# Patient Record
Sex: FEMALE | Race: WHITE | Employment: FULL TIME
[De-identification: names, ages, dates, MRNs, and addresses within clinical notes are randomized per-mention and may not be internally consistent; named-entity substitution may affect disease eponyms.]

---

## 2018-03-15 ENCOUNTER — TELEPHONE (OUTPATIENT)
Dept: CASE MANAGEMENT | Age: 48
End: 2018-03-15

## 2018-03-15 NOTE — LETTER
Program or our CT Lung Cancer Screening Program,  please don't hesitate to call. Sincerely,    Pulmonary Nodule Program  [address]:  [phone]  F:  [phone]      3/15/2018      Jonna Soler  [address] [facility] [address]              Dear Angie Branch records indicate that over the past year you had an imaging study/studies done while a patient at a [facility] facility. Because your health is our primary concern, we want to make sure we have the correct primary care physician on file. We currently have Nadia Bustamante MD noted as your primary care physician. If this information is not accurate, please call [phone] and provide us with the correct information. If this information is correct, please make sure you have discussed your visit with your physician and understand all results and any follow up recommendations that may or may not be needed now or in the future.            Sincerely,    [address]:  [phone]  F:  [phone]

## 2018-03-15 NOTE — TELEPHONE ENCOUNTER
No call was made, encounter was opened for documentation in the lung nodule navigator flow sheet. Patient has suggested follow up per [place] for management of incidental pulmonary nodules. Patients with nodules measuring under 0.8cm are not automatically enrolled into incidental pulmonary nodule program.   Notification of nodules letter faxed to Patrick Taylor MD 3/15/2018 4:10 PM along with instructions on how to enroll this patient into our incidental pulmonary nodule program if desired. Receipt verified. Letter mailed to patients home stating she should contact her primary care physician to discuss any follow up recommendations.    Sari Webb, Lung Nodule Navigator

## 2018-04-06 ENCOUNTER — HOSPITAL ENCOUNTER (INPATIENT)
Age: 48
LOS: 1 days | Discharge: HOME | DRG: 069 | End: 2018-04-07
Attending: EMERGENCY MEDICINE | Admitting: INTERNAL MEDICINE
Payer: COMMERCIAL

## 2018-04-06 ENCOUNTER — APPOINTMENT (OUTPATIENT)
Dept: CT IMAGING | Age: 48
DRG: 069 | End: 2018-04-06
Payer: COMMERCIAL

## 2018-04-06 DIAGNOSIS — G45.9 TRANSIENT CEREBRAL ISCHEMIA, UNSPECIFIED TYPE: Primary | ICD-10-CM

## 2018-04-06 PROBLEM — R47.1 DYSARTHRIA: Status: ACTIVE | Noted: 2018-04-06

## 2018-04-06 PROBLEM — E78.2 MIXED HYPERLIPIDEMIA: Status: ACTIVE | Noted: 2018-04-06

## 2018-04-06 PROBLEM — I10 HTN (HYPERTENSION): Status: ACTIVE | Noted: 2018-04-06

## 2018-04-06 PROBLEM — R29.810 FACIAL DROOP: Status: ACTIVE | Noted: 2018-04-06

## 2018-04-06 LAB
ALBUMIN SERPL-MCNC: 4 G/DL (ref 3.5–5.2)
ALP BLD-CCNC: 84 U/L (ref 35–104)
ALT SERPL-CCNC: 10 U/L (ref 0–32)
ANION GAP SERPL CALCULATED.3IONS-SCNC: 19 MMOL/L (ref 7–16)
APTT: 28.7 SEC (ref 24.5–35.1)
AST SERPL-CCNC: 15 U/L (ref 0–31)
BILIRUB SERPL-MCNC: 0.5 MG/DL (ref 0–1.2)
BUN BLDV-MCNC: 9 MG/DL (ref 6–20)
CALCIUM SERPL-MCNC: 9.2 MG/DL (ref 8.6–10.2)
CHLORIDE BLD-SCNC: 99 MMOL/L (ref 98–107)
CO2: 21 MMOL/L (ref 22–29)
CREAT SERPL-MCNC: 0.7 MG/DL (ref 0.5–1)
GFR AFRICAN AMERICAN: >60
GFR NON-AFRICAN AMERICAN: >60 ML/MIN/1.73
GLUCOSE BLD-MCNC: 101 MG/DL (ref 74–109)
HCT VFR BLD CALC: 42.8 % (ref 34–48)
HEMOGLOBIN: 14 G/DL (ref 11.5–15.5)
INR BLD: 1.2
LACTIC ACID: 1 MMOL/L (ref 0.5–2.2)
MCH RBC QN AUTO: 27.5 PG (ref 26–35)
MCHC RBC AUTO-ENTMCNC: 32.7 % (ref 32–34.5)
MCV RBC AUTO: 84.1 FL (ref 80–99.9)
PDW BLD-RTO: 13.9 FL (ref 11.5–15)
PLATELET # BLD: 288 E9/L (ref 130–450)
PMV BLD AUTO: 10.8 FL (ref 7–12)
POTASSIUM SERPL-SCNC: 4 MMOL/L (ref 3.5–5)
PROTHROMBIN TIME: 12.6 SEC (ref 9.3–12.4)
RBC # BLD: 5.09 E12/L (ref 3.5–5.5)
SODIUM BLD-SCNC: 139 MMOL/L (ref 132–146)
TOTAL PROTEIN: 7.1 G/DL (ref 6.4–8.3)
TROPONIN: <0.01 NG/ML (ref 0–0.03)
WBC # BLD: 13 E9/L (ref 4.5–11.5)

## 2018-04-06 PROCEDURE — 99291 CRITICAL CARE FIRST HOUR: CPT | Performed by: NURSE PRACTITIONER

## 2018-04-06 PROCEDURE — 6360000004 HC RX CONTRAST MEDICATION: Performed by: RADIOLOGY

## 2018-04-06 PROCEDURE — 93005 ELECTROCARDIOGRAM TRACING: CPT | Performed by: EMERGENCY MEDICINE

## 2018-04-06 PROCEDURE — 93005 ELECTROCARDIOGRAM TRACING: CPT

## 2018-04-06 PROCEDURE — 70498 CT ANGIOGRAPHY NECK: CPT

## 2018-04-06 PROCEDURE — 85027 COMPLETE CBC AUTOMATED: CPT

## 2018-04-06 PROCEDURE — 80053 COMPREHEN METABOLIC PANEL: CPT

## 2018-04-06 PROCEDURE — 83605 ASSAY OF LACTIC ACID: CPT

## 2018-04-06 PROCEDURE — 70450 CT HEAD/BRAIN W/O DYE: CPT

## 2018-04-06 PROCEDURE — 99285 EMERGENCY DEPT VISIT HI MDM: CPT

## 2018-04-06 PROCEDURE — 84484 ASSAY OF TROPONIN QUANT: CPT

## 2018-04-06 PROCEDURE — 70496 CT ANGIOGRAPHY HEAD: CPT

## 2018-04-06 PROCEDURE — 85730 THROMBOPLASTIN TIME PARTIAL: CPT

## 2018-04-06 PROCEDURE — 6370000000 HC RX 637 (ALT 250 FOR IP): Performed by: INTERNAL MEDICINE

## 2018-04-06 PROCEDURE — 0042T CT BRAIN PERFUSION: CPT

## 2018-04-06 PROCEDURE — 36415 COLL VENOUS BLD VENIPUNCTURE: CPT

## 2018-04-06 PROCEDURE — 2060000000 HC ICU INTERMEDIATE R&B

## 2018-04-06 PROCEDURE — 85610 PROTHROMBIN TIME: CPT

## 2018-04-06 PROCEDURE — 2580000003 HC RX 258: Performed by: INTERNAL MEDICINE

## 2018-04-06 RX ORDER — ACETAMINOPHEN 325 MG/1
650 TABLET ORAL EVERY 4 HOURS PRN
Status: DISCONTINUED | OUTPATIENT
Start: 2018-04-06 | End: 2018-04-07 | Stop reason: HOSPADM

## 2018-04-06 RX ORDER — VITAMIN C
1 TAB ORAL DAILY
Status: DISCONTINUED | OUTPATIENT
Start: 2018-04-07 | End: 2018-04-07 | Stop reason: HOSPADM

## 2018-04-06 RX ORDER — SODIUM CHLORIDE 0.9 % (FLUSH) 0.9 %
10 SYRINGE (ML) INJECTION PRN
Status: DISCONTINUED | OUTPATIENT
Start: 2018-04-06 | End: 2018-04-07 | Stop reason: HOSPADM

## 2018-04-06 RX ORDER — EPINEPHRINE 0.3 MG/.3ML
0.3 INJECTION SUBCUTANEOUS PRN
Status: ON HOLD | COMMUNITY
End: 2018-04-07 | Stop reason: HOSPADM

## 2018-04-06 RX ORDER — ATORVASTATIN CALCIUM 40 MG/1
40 TABLET, FILM COATED ORAL NIGHTLY
Status: DISCONTINUED | OUTPATIENT
Start: 2018-04-06 | End: 2018-04-07

## 2018-04-06 RX ORDER — OMEGA-3S/DHA/EPA/FISH OIL/D3 300MG-1000
800 CAPSULE ORAL DAILY
Status: DISCONTINUED | OUTPATIENT
Start: 2018-04-07 | End: 2018-04-07 | Stop reason: HOSPADM

## 2018-04-06 RX ORDER — AMLODIPINE BESYLATE 2.5 MG/1
2.5 TABLET ORAL DAILY
Status: DISCONTINUED | OUTPATIENT
Start: 2018-04-07 | End: 2018-04-06

## 2018-04-06 RX ORDER — SODIUM CHLORIDE 0.9 % (FLUSH) 0.9 %
10 SYRINGE (ML) INJECTION EVERY 12 HOURS SCHEDULED
Status: DISCONTINUED | OUTPATIENT
Start: 2018-04-06 | End: 2018-04-07 | Stop reason: HOSPADM

## 2018-04-06 RX ORDER — SODIUM CHLORIDE 0.9 % (FLUSH) 0.9 %
10 SYRINGE (ML) INJECTION 2 TIMES DAILY
Status: DISCONTINUED | OUTPATIENT
Start: 2018-04-06 | End: 2018-04-07 | Stop reason: HOSPADM

## 2018-04-06 RX ORDER — VITAMIN B COMPLEX
1 CAPSULE ORAL EVERY OTHER DAY
COMMUNITY
End: 2021-02-10

## 2018-04-06 RX ORDER — LANOLIN ALCOHOL/MO/W.PET/CERES
200 CREAM (GRAM) TOPICAL DAILY
Status: DISCONTINUED | OUTPATIENT
Start: 2018-04-06 | End: 2018-04-07 | Stop reason: HOSPADM

## 2018-04-06 RX ORDER — AMLODIPINE BESYLATE 5 MG/1
5 TABLET ORAL DAILY
Status: DISCONTINUED | OUTPATIENT
Start: 2018-04-07 | End: 2018-04-07 | Stop reason: HOSPADM

## 2018-04-06 RX ORDER — BUPROPION HYDROCHLORIDE 100 MG/1
100 TABLET, EXTENDED RELEASE ORAL DAILY
Status: DISCONTINUED | OUTPATIENT
Start: 2018-04-06 | End: 2018-04-07 | Stop reason: HOSPADM

## 2018-04-06 RX ORDER — BUPROPION HYDROCHLORIDE 100 MG/1
1 TABLET, EXTENDED RELEASE ORAL DAILY
COMMUNITY
Start: 2018-01-22 | End: 2019-01-11 | Stop reason: ALTCHOICE

## 2018-04-06 RX ADMIN — ASPIRIN 325 MG: 325 TABLET, DELAYED RELEASE ORAL at 21:06

## 2018-04-06 RX ADMIN — DESMOPRESSIN ACETATE 40 MG: 0.2 TABLET ORAL at 21:06

## 2018-04-06 RX ADMIN — IOPAMIDOL 100 ML: 755 INJECTION, SOLUTION INTRAVENOUS at 12:45

## 2018-04-06 RX ADMIN — Medication 10 ML: at 21:10

## 2018-04-06 ASSESSMENT — ENCOUNTER SYMPTOMS
TROUBLE SWALLOWING: 0
NAUSEA: 0
VOMITING: 0

## 2018-04-06 ASSESSMENT — PAIN SCALES - GENERAL
PAINLEVEL_OUTOF10: 0
PAINLEVEL_OUTOF10: 0

## 2018-04-07 VITALS
TEMPERATURE: 98.4 F | SYSTOLIC BLOOD PRESSURE: 145 MMHG | HEIGHT: 68 IN | DIASTOLIC BLOOD PRESSURE: 95 MMHG | OXYGEN SATURATION: 97 % | WEIGHT: 293 LBS | BODY MASS INDEX: 44.41 KG/M2 | HEART RATE: 95 BPM | RESPIRATION RATE: 18 BRPM

## 2018-04-07 LAB
ALBUMIN SERPL-MCNC: 4.1 G/DL (ref 3.5–5.2)
ALP BLD-CCNC: 72 U/L (ref 35–104)
ALT SERPL-CCNC: 7 U/L (ref 0–32)
ANION GAP SERPL CALCULATED.3IONS-SCNC: 12 MMOL/L (ref 7–16)
AST SERPL-CCNC: 12 U/L (ref 0–31)
BILIRUB SERPL-MCNC: 0.5 MG/DL (ref 0–1.2)
BUN BLDV-MCNC: 10 MG/DL (ref 6–20)
CALCIUM SERPL-MCNC: 9.1 MG/DL (ref 8.6–10.2)
CHLORIDE BLD-SCNC: 99 MMOL/L (ref 98–107)
CHOLESTEROL, TOTAL: 150 MG/DL (ref 0–199)
CO2: 27 MMOL/L (ref 22–29)
CREAT SERPL-MCNC: 0.7 MG/DL (ref 0.5–1)
GFR AFRICAN AMERICAN: >60
GFR NON-AFRICAN AMERICAN: >60 ML/MIN/1.73
GLUCOSE BLD-MCNC: 98 MG/DL (ref 74–109)
HBA1C MFR BLD: 5 % (ref 4.8–5.9)
HCT VFR BLD CALC: 41 % (ref 34–48)
HDLC SERPL-MCNC: 48 MG/DL
HEMOGLOBIN: 13.3 G/DL (ref 11.5–15.5)
INR BLD: 1.2
LACTIC ACID: 0.7 MMOL/L (ref 0.5–2.2)
LDL CHOLESTEROL CALCULATED: 91 MG/DL (ref 0–99)
MCH RBC QN AUTO: 27.5 PG (ref 26–35)
MCHC RBC AUTO-ENTMCNC: 32.4 % (ref 32–34.5)
MCV RBC AUTO: 84.9 FL (ref 80–99.9)
PDW BLD-RTO: 14 FL (ref 11.5–15)
PLATELET # BLD: 274 E9/L (ref 130–450)
PMV BLD AUTO: 10.9 FL (ref 7–12)
POTASSIUM SERPL-SCNC: 3.8 MMOL/L (ref 3.5–5)
PROTHROMBIN TIME: 12.9 SEC (ref 9.3–12.4)
RBC # BLD: 4.83 E12/L (ref 3.5–5.5)
SEDIMENTATION RATE, ERYTHROCYTE: 25 MM/HR (ref 0–20)
SODIUM BLD-SCNC: 138 MMOL/L (ref 132–146)
TOTAL PROTEIN: 6.6 G/DL (ref 6.4–8.3)
TRIGL SERPL-MCNC: 56 MG/DL (ref 0–149)
TROPONIN: <0.01 NG/ML (ref 0–0.03)
VLDLC SERPL CALC-MCNC: 11 MG/DL
WBC # BLD: 9.1 E9/L (ref 4.5–11.5)

## 2018-04-07 PROCEDURE — 36415 COLL VENOUS BLD VENIPUNCTURE: CPT

## 2018-04-07 PROCEDURE — 80053 COMPREHEN METABOLIC PANEL: CPT

## 2018-04-07 PROCEDURE — 85651 RBC SED RATE NONAUTOMATED: CPT

## 2018-04-07 PROCEDURE — 84484 ASSAY OF TROPONIN QUANT: CPT

## 2018-04-07 PROCEDURE — 2580000003 HC RX 258: Performed by: EMERGENCY MEDICINE

## 2018-04-07 PROCEDURE — 85027 COMPLETE CBC AUTOMATED: CPT

## 2018-04-07 PROCEDURE — 6370000000 HC RX 637 (ALT 250 FOR IP): Performed by: INTERNAL MEDICINE

## 2018-04-07 PROCEDURE — 83605 ASSAY OF LACTIC ACID: CPT

## 2018-04-07 PROCEDURE — 80061 LIPID PANEL: CPT

## 2018-04-07 PROCEDURE — 6370000000 HC RX 637 (ALT 250 FOR IP): Performed by: EMERGENCY MEDICINE

## 2018-04-07 PROCEDURE — 83036 HEMOGLOBIN GLYCOSYLATED A1C: CPT

## 2018-04-07 PROCEDURE — 85610 PROTHROMBIN TIME: CPT

## 2018-04-07 RX ORDER — ASPIRIN 81 MG/1
81 TABLET ORAL 2 TIMES DAILY
Status: DISCONTINUED | OUTPATIENT
Start: 2018-04-07 | End: 2018-04-07 | Stop reason: HOSPADM

## 2018-04-07 RX ORDER — ATORVASTATIN CALCIUM 20 MG/1
20 TABLET, FILM COATED ORAL NIGHTLY
Qty: 30 TABLET | Refills: 3 | Status: SHIPPED | OUTPATIENT
Start: 2018-04-07 | End: 2018-04-13 | Stop reason: SDUPTHER

## 2018-04-07 RX ORDER — ASPIRIN 81 MG/1
81 TABLET ORAL 2 TIMES DAILY
Qty: 30 TABLET | Refills: 3 | Status: SHIPPED | OUTPATIENT
Start: 2018-04-07 | End: 2018-04-13 | Stop reason: SDUPTHER

## 2018-04-07 RX ORDER — AMLODIPINE BESYLATE 5 MG/1
5 TABLET ORAL DAILY
Qty: 30 TABLET | Refills: 3 | Status: SHIPPED | OUTPATIENT
Start: 2018-04-08 | End: 2021-02-10

## 2018-04-07 RX ORDER — ATORVASTATIN CALCIUM 20 MG/1
20 TABLET, FILM COATED ORAL NIGHTLY
Status: DISCONTINUED | OUTPATIENT
Start: 2018-04-07 | End: 2018-04-07 | Stop reason: HOSPADM

## 2018-04-07 RX ADMIN — MAGNESIUM GLUCONATE 500 MG ORAL TABLET 200 MG: 500 TABLET ORAL at 08:01

## 2018-04-07 RX ADMIN — ASPIRIN 325 MG: 325 TABLET, DELAYED RELEASE ORAL at 08:01

## 2018-04-07 RX ADMIN — BUPROPION HYDROCHLORIDE 100 MG: 100 TABLET, EXTENDED RELEASE ORAL at 08:01

## 2018-04-07 RX ADMIN — AMLODIPINE BESYLATE 5 MG: 5 TABLET ORAL at 08:01

## 2018-04-07 RX ADMIN — CHOLECALCIFEROL TAB 10 MCG (400 UNIT) 800 UNITS: 10 TAB at 08:01

## 2018-04-07 RX ADMIN — Medication 10 ML: at 08:02

## 2018-04-07 RX ADMIN — ACETAMINOPHEN 650 MG: 325 TABLET, FILM COATED ORAL at 00:22

## 2018-04-07 RX ADMIN — VITAMIN C 1 TABLET: TAB at 08:01

## 2018-04-07 ASSESSMENT — PAIN SCALES - GENERAL
PAINLEVEL_OUTOF10: 4
PAINLEVEL_OUTOF10: 0
PAINLEVEL_OUTOF10: 0

## 2018-04-07 ASSESSMENT — PAIN DESCRIPTION - ONSET: ONSET: AWAKENED FROM SLEEP

## 2018-04-07 ASSESSMENT — PAIN DESCRIPTION - ORIENTATION: ORIENTATION: LEFT

## 2018-04-07 ASSESSMENT — PAIN DESCRIPTION - DESCRIPTORS: DESCRIPTORS: DISCOMFORT;DULL;HEADACHE

## 2018-04-07 ASSESSMENT — PAIN DESCRIPTION - FREQUENCY: FREQUENCY: INTERMITTENT

## 2018-04-07 ASSESSMENT — PAIN DESCRIPTION - LOCATION: LOCATION: HEAD

## 2018-04-07 ASSESSMENT — PAIN DESCRIPTION - PROGRESSION: CLINICAL_PROGRESSION: GRADUALLY WORSENING

## 2018-04-07 ASSESSMENT — PAIN DESCRIPTION - PAIN TYPE: TYPE: ACUTE PAIN

## 2018-04-13 PROBLEM — Z79.899 DRUG THERAPY: Status: ACTIVE | Noted: 2018-04-13

## 2018-04-16 PROBLEM — E66.01 MORBID OBESITY WITH BMI OF 40.0-44.9, ADULT (HCC): Status: ACTIVE | Noted: 2018-04-16

## 2018-04-16 PROBLEM — G45.0 VERTEBROBASILAR TIAS: Status: ACTIVE | Noted: 2018-04-16

## 2018-04-16 PROBLEM — E78.00 HYPERCHOLESTEROLEMIA: Status: ACTIVE | Noted: 2018-04-16

## 2018-04-17 LAB
EKG ATRIAL RATE: 102 BPM
EKG P AXIS: 58 DEGREES
EKG P-R INTERVAL: 130 MS
EKG Q-T INTERVAL: 354 MS
EKG QRS DURATION: 86 MS
EKG QTC CALCULATION (BAZETT): 461 MS
EKG R AXIS: 88 DEGREES
EKG T AXIS: 53 DEGREES
EKG VENTRICULAR RATE: 102 BPM

## 2019-01-11 ENCOUNTER — APPOINTMENT (OUTPATIENT)
Dept: NUCLEAR MEDICINE | Age: 49
End: 2019-01-11
Payer: COMMERCIAL

## 2019-01-11 ENCOUNTER — APPOINTMENT (OUTPATIENT)
Dept: GENERAL RADIOLOGY | Age: 49
End: 2019-01-11
Payer: COMMERCIAL

## 2019-01-11 ENCOUNTER — HOSPITAL ENCOUNTER (OUTPATIENT)
Age: 49
Setting detail: OBSERVATION
Discharge: HOME | End: 2019-01-12
Attending: EMERGENCY MEDICINE | Admitting: INTERNAL MEDICINE
Payer: COMMERCIAL

## 2019-01-11 DIAGNOSIS — R07.9 CHEST PAIN, UNSPECIFIED TYPE: Primary | ICD-10-CM

## 2019-01-11 PROBLEM — G45.9 TIA (TRANSIENT ISCHEMIC ATTACK): Status: RESOLVED | Noted: 2018-04-06 | Resolved: 2019-01-11

## 2019-01-11 PROBLEM — R47.1 DYSARTHRIA: Status: RESOLVED | Noted: 2018-04-06 | Resolved: 2019-01-11

## 2019-01-11 LAB
ALBUMIN SERPL-MCNC: 4.5 G/DL (ref 3.5–5.2)
ALP BLD-CCNC: 88 U/L (ref 35–104)
ALT SERPL-CCNC: 11 U/L (ref 0–32)
ANION GAP SERPL CALCULATED.3IONS-SCNC: 13 MMOL/L (ref 7–16)
APTT: 31 SEC (ref 24.5–35.1)
AST SERPL-CCNC: 15 U/L (ref 0–31)
BASOPHILS ABSOLUTE: 0.1 E9/L (ref 0–0.2)
BASOPHILS RELATIVE PERCENT: 0.6 % (ref 0–2)
BILIRUB SERPL-MCNC: 0.5 MG/DL (ref 0–1.2)
BUN BLDV-MCNC: 11 MG/DL (ref 6–20)
C-REACTIVE PROTEIN, HIGH SENSITIVITY: 6.2 MG/L (ref 0–3)
CALCIUM SERPL-MCNC: 9.4 MG/DL (ref 8.6–10.2)
CHLORIDE BLD-SCNC: 102 MMOL/L (ref 98–107)
CO2: 27 MMOL/L (ref 22–29)
CREAT SERPL-MCNC: 0.7 MG/DL (ref 0.5–1)
D DIMER: <200 NG/ML DDU
EOSINOPHILS ABSOLUTE: 0.35 E9/L (ref 0.05–0.5)
EOSINOPHILS RELATIVE PERCENT: 2.1 % (ref 0–6)
GFR AFRICAN AMERICAN: >60
GFR NON-AFRICAN AMERICAN: >60 ML/MIN/1.73
GLUCOSE BLD-MCNC: 90 MG/DL (ref 74–99)
HCG QUALITATIVE: NEGATIVE
HCT VFR BLD CALC: 44.5 % (ref 34–48)
HEMOGLOBIN: 14.1 G/DL (ref 11.5–15.5)
IMMATURE GRANULOCYTES #: 0.07 E9/L
IMMATURE GRANULOCYTES %: 0.4 % (ref 0–5)
INR BLD: 1.1
LV EF: 70 %
LVEF MODALITY: NORMAL
LYMPHOCYTES ABSOLUTE: 2.94 E9/L (ref 1.5–4)
LYMPHOCYTES RELATIVE PERCENT: 17.8 % (ref 20–42)
MCH RBC QN AUTO: 27.4 PG (ref 26–35)
MCHC RBC AUTO-ENTMCNC: 31.7 % (ref 32–34.5)
MCV RBC AUTO: 86.6 FL (ref 80–99.9)
MONOCYTES ABSOLUTE: 1.25 E9/L (ref 0.1–0.95)
MONOCYTES RELATIVE PERCENT: 7.6 % (ref 2–12)
NEUTROPHILS ABSOLUTE: 11.78 E9/L (ref 1.8–7.3)
NEUTROPHILS RELATIVE PERCENT: 71.5 % (ref 43–80)
PDW BLD-RTO: 13.2 FL (ref 11.5–15)
PLATELET # BLD: 308 E9/L (ref 130–450)
PMV BLD AUTO: 11.1 FL (ref 7–12)
POTASSIUM SERPL-SCNC: 3.5 MMOL/L (ref 3.5–5)
PROTHROMBIN TIME: 11.9 SEC (ref 9.3–12.4)
RBC # BLD: 5.14 E12/L (ref 3.5–5.5)
SODIUM BLD-SCNC: 142 MMOL/L (ref 132–146)
TOTAL PROTEIN: 7.1 G/DL (ref 6.4–8.3)
TROPONIN: <0.01 NG/ML (ref 0–0.03)
TROPONIN: <0.01 NG/ML (ref 0–0.03)
WBC # BLD: 16.5 E9/L (ref 4.5–11.5)

## 2019-01-11 PROCEDURE — 36415 COLL VENOUS BLD VENIPUNCTURE: CPT

## 2019-01-11 PROCEDURE — 99285 EMERGENCY DEPT VISIT HI MDM: CPT

## 2019-01-11 PROCEDURE — 93308 TTE F-UP OR LMTD: CPT

## 2019-01-11 PROCEDURE — 85730 THROMBOPLASTIN TIME PARTIAL: CPT

## 2019-01-11 PROCEDURE — 6360000002 HC RX W HCPCS: Performed by: INTERNAL MEDICINE

## 2019-01-11 PROCEDURE — 3430000000 HC RX DIAGNOSTIC RADIOPHARMACEUTICAL: Performed by: RADIOLOGY

## 2019-01-11 PROCEDURE — 86141 C-REACTIVE PROTEIN HS: CPT

## 2019-01-11 PROCEDURE — 96372 THER/PROPH/DIAG INJ SC/IM: CPT

## 2019-01-11 PROCEDURE — 85610 PROTHROMBIN TIME: CPT

## 2019-01-11 PROCEDURE — 78452 HT MUSCLE IMAGE SPECT MULT: CPT

## 2019-01-11 PROCEDURE — G0378 HOSPITAL OBSERVATION PER HR: HCPCS

## 2019-01-11 PROCEDURE — 85025 COMPLETE CBC W/AUTO DIFF WBC: CPT

## 2019-01-11 PROCEDURE — 96376 TX/PRO/DX INJ SAME DRUG ADON: CPT

## 2019-01-11 PROCEDURE — 71045 X-RAY EXAM CHEST 1 VIEW: CPT

## 2019-01-11 PROCEDURE — A9500 TC99M SESTAMIBI: HCPCS | Performed by: RADIOLOGY

## 2019-01-11 PROCEDURE — 6370000000 HC RX 637 (ALT 250 FOR IP): Performed by: INTERNAL MEDICINE

## 2019-01-11 PROCEDURE — 96374 THER/PROPH/DIAG INJ IV PUSH: CPT

## 2019-01-11 PROCEDURE — 6370000000 HC RX 637 (ALT 250 FOR IP): Performed by: EMERGENCY MEDICINE

## 2019-01-11 PROCEDURE — 84484 ASSAY OF TROPONIN QUANT: CPT

## 2019-01-11 PROCEDURE — 2580000003 HC RX 258: Performed by: INTERNAL MEDICINE

## 2019-01-11 PROCEDURE — 6360000002 HC RX W HCPCS: Performed by: EMERGENCY MEDICINE

## 2019-01-11 PROCEDURE — 85378 FIBRIN DEGRADE SEMIQUANT: CPT

## 2019-01-11 PROCEDURE — 80053 COMPREHEN METABOLIC PANEL: CPT

## 2019-01-11 PROCEDURE — 93017 CV STRESS TEST TRACING ONLY: CPT

## 2019-01-11 PROCEDURE — 84703 CHORIONIC GONADOTROPIN ASSAY: CPT

## 2019-01-11 RX ORDER — SODIUM CHLORIDE 0.9 % (FLUSH) 0.9 %
10 SYRINGE (ML) INJECTION PRN
Status: DISCONTINUED | OUTPATIENT
Start: 2019-01-11 | End: 2019-01-12 | Stop reason: HOSPADM

## 2019-01-11 RX ORDER — HYDROCHLOROTHIAZIDE 12.5 MG/1
12.5 TABLET ORAL EVERY MORNING
Status: DISCONTINUED | OUTPATIENT
Start: 2019-01-12 | End: 2019-01-12 | Stop reason: HOSPADM

## 2019-01-11 RX ORDER — ASPIRIN 81 MG/1
81 TABLET ORAL EVERY MORNING
Status: DISCONTINUED | OUTPATIENT
Start: 2019-01-12 | End: 2019-01-12 | Stop reason: HOSPADM

## 2019-01-11 RX ORDER — ACETAMINOPHEN 500 MG
1000 TABLET ORAL EVERY 6 HOURS PRN
COMMUNITY

## 2019-01-11 RX ORDER — ACETAMINOPHEN 500 MG
1000 TABLET ORAL EVERY 6 HOURS PRN
Status: DISCONTINUED | OUTPATIENT
Start: 2019-01-11 | End: 2019-01-12 | Stop reason: HOSPADM

## 2019-01-11 RX ORDER — AMLODIPINE BESYLATE 5 MG/1
5 TABLET ORAL EVERY MORNING
Status: DISCONTINUED | OUTPATIENT
Start: 2019-01-12 | End: 2019-01-12 | Stop reason: HOSPADM

## 2019-01-11 RX ORDER — LORAZEPAM 2 MG/ML
1 INJECTION INTRAMUSCULAR ONCE
Status: DISCONTINUED | OUTPATIENT
Start: 2019-01-11 | End: 2019-01-12 | Stop reason: HOSPADM

## 2019-01-11 RX ORDER — ASPIRIN 81 MG/1
243 TABLET, CHEWABLE ORAL ONCE
Status: COMPLETED | OUTPATIENT
Start: 2019-01-11 | End: 2019-01-11

## 2019-01-11 RX ORDER — MORPHINE SULFATE 2 MG/ML
2 INJECTION, SOLUTION INTRAMUSCULAR; INTRAVENOUS ONCE
Status: COMPLETED | OUTPATIENT
Start: 2019-01-11 | End: 2019-01-11

## 2019-01-11 RX ORDER — NAPROXEN 500 MG/1
500 TABLET ORAL 2 TIMES DAILY WITH MEALS
Status: DISCONTINUED | OUTPATIENT
Start: 2019-01-11 | End: 2019-01-12 | Stop reason: HOSPADM

## 2019-01-11 RX ORDER — LANOLIN ALCOHOL/MO/W.PET/CERES
200 CREAM (GRAM) TOPICAL EVERY MORNING
Status: DISCONTINUED | OUTPATIENT
Start: 2019-01-12 | End: 2019-01-12 | Stop reason: HOSPADM

## 2019-01-11 RX ORDER — SODIUM CHLORIDE 0.9 % (FLUSH) 0.9 %
10 SYRINGE (ML) INJECTION EVERY 12 HOURS SCHEDULED
Status: DISCONTINUED | OUTPATIENT
Start: 2019-01-11 | End: 2019-01-12 | Stop reason: HOSPADM

## 2019-01-11 RX ORDER — ESCITALOPRAM OXALATE 10 MG/1
10 TABLET ORAL EVERY MORNING
Status: DISCONTINUED | OUTPATIENT
Start: 2019-01-12 | End: 2019-01-12 | Stop reason: HOSPADM

## 2019-01-11 RX ORDER — ACETAMINOPHEN 325 MG/1
650 TABLET ORAL EVERY 4 HOURS PRN
Status: DISCONTINUED | OUTPATIENT
Start: 2019-01-11 | End: 2019-01-12 | Stop reason: HOSPADM

## 2019-01-11 RX ORDER — AZITHROMYCIN 250 MG/1
250 TABLET, FILM COATED ORAL DAILY
Status: DISCONTINUED | OUTPATIENT
Start: 2019-01-12 | End: 2019-01-12 | Stop reason: HOSPADM

## 2019-01-11 RX ORDER — OMEGA-3S/DHA/EPA/FISH OIL/D3 300MG-1000
800 CAPSULE ORAL EVERY MORNING
Status: DISCONTINUED | OUTPATIENT
Start: 2019-01-12 | End: 2019-01-12 | Stop reason: HOSPADM

## 2019-01-11 RX ORDER — MORPHINE SULFATE 2 MG/ML
2 INJECTION, SOLUTION INTRAMUSCULAR; INTRAVENOUS EVERY 4 HOURS PRN
Status: DISCONTINUED | OUTPATIENT
Start: 2019-01-11 | End: 2019-01-12

## 2019-01-11 RX ORDER — SODIUM CHLORIDE 0.9 % (FLUSH) 0.9 %
10 SYRINGE (ML) INJECTION EVERY 12 HOURS SCHEDULED
Status: DISCONTINUED | OUTPATIENT
Start: 2019-01-11 | End: 2019-01-11 | Stop reason: SDUPTHER

## 2019-01-11 RX ORDER — AZITHROMYCIN 250 MG/1
500 TABLET, FILM COATED ORAL DAILY
Status: COMPLETED | OUTPATIENT
Start: 2019-01-11 | End: 2019-01-11

## 2019-01-11 RX ORDER — SODIUM CHLORIDE 0.9 % (FLUSH) 0.9 %
10 SYRINGE (ML) INJECTION PRN
Status: DISCONTINUED | OUTPATIENT
Start: 2019-01-11 | End: 2019-01-11 | Stop reason: SDUPTHER

## 2019-01-11 RX ORDER — ESCITALOPRAM OXALATE 10 MG/1
10 TABLET ORAL EVERY MORNING
COMMUNITY
End: 2021-02-10

## 2019-01-11 RX ORDER — ONDANSETRON 2 MG/ML
4 INJECTION INTRAMUSCULAR; INTRAVENOUS EVERY 6 HOURS PRN
Status: DISCONTINUED | OUTPATIENT
Start: 2019-01-11 | End: 2019-01-12 | Stop reason: HOSPADM

## 2019-01-11 RX ORDER — ONDANSETRON 2 MG/ML
4 INJECTION INTRAMUSCULAR; INTRAVENOUS EVERY 8 HOURS PRN
Status: DISCONTINUED | OUTPATIENT
Start: 2019-01-11 | End: 2019-01-11

## 2019-01-11 RX ADMIN — ASPIRIN 81 MG 243 MG: 81 TABLET ORAL at 11:03

## 2019-01-11 RX ADMIN — Medication 30 MILLICURIE: at 14:21

## 2019-01-11 RX ADMIN — ENOXAPARIN SODIUM 40 MG: 40 INJECTION SUBCUTANEOUS at 12:49

## 2019-01-11 RX ADMIN — MORPHINE SULFATE 2 MG: 2 INJECTION, SOLUTION INTRAMUSCULAR; INTRAVENOUS at 21:08

## 2019-01-11 RX ADMIN — NITROGLYCERIN 0.5 INCH: 20 OINTMENT TOPICAL at 11:04

## 2019-01-11 RX ADMIN — AZITHROMYCIN 500 MG: 250 TABLET, FILM COATED ORAL at 18:40

## 2019-01-11 RX ADMIN — Medication 10 ML: at 21:09

## 2019-01-11 RX ADMIN — ACETAMINOPHEN 650 MG: 325 TABLET ORAL at 16:40

## 2019-01-11 RX ADMIN — MORPHINE SULFATE 2 MG: 2 INJECTION, SOLUTION INTRAMUSCULAR; INTRAVENOUS at 11:04

## 2019-01-11 RX ADMIN — REGADENOSON 0.4 MG: 0.08 INJECTION, SOLUTION INTRAVENOUS at 14:51

## 2019-01-11 RX ADMIN — Medication 10 MILLICURIE: at 14:20

## 2019-01-11 RX ADMIN — NAPROXEN 500 MG: 500 TABLET ORAL at 18:40

## 2019-01-11 ASSESSMENT — PAIN DESCRIPTION - PAIN TYPE
TYPE: ACUTE PAIN

## 2019-01-11 ASSESSMENT — PAIN DESCRIPTION - ORIENTATION
ORIENTATION: MID

## 2019-01-11 ASSESSMENT — PAIN DESCRIPTION - LOCATION
LOCATION: HEAD
LOCATION: CHEST

## 2019-01-11 ASSESSMENT — PAIN SCALES - GENERAL
PAINLEVEL_OUTOF10: 3
PAINLEVEL_OUTOF10: 0
PAINLEVEL_OUTOF10: 4
PAINLEVEL_OUTOF10: 3
PAINLEVEL_OUTOF10: 0
PAINLEVEL_OUTOF10: 0
PAINLEVEL_OUTOF10: 6
PAINLEVEL_OUTOF10: 6
PAINLEVEL_OUTOF10: 9
PAINLEVEL_OUTOF10: 6
PAINLEVEL_OUTOF10: 2

## 2019-01-11 ASSESSMENT — PAIN - FUNCTIONAL ASSESSMENT: PAIN_FUNCTIONAL_ASSESSMENT: ACTIVITIES ARE NOT PREVENTED

## 2019-01-11 ASSESSMENT — PAIN DESCRIPTION - DESCRIPTORS
DESCRIPTORS: ACHING

## 2019-01-11 ASSESSMENT — PAIN DESCRIPTION - FREQUENCY
FREQUENCY: CONTINUOUS
FREQUENCY: INTERMITTENT

## 2019-01-11 ASSESSMENT — PAIN SCALES - WONG BAKER
WONGBAKER_NUMERICALRESPONSE: 4
WONGBAKER_NUMERICALRESPONSE: 2

## 2019-01-11 ASSESSMENT — PAIN DESCRIPTION - PROGRESSION: CLINICAL_PROGRESSION: GRADUALLY IMPROVING

## 2019-01-12 VITALS
DIASTOLIC BLOOD PRESSURE: 60 MMHG | TEMPERATURE: 97.9 F | OXYGEN SATURATION: 96 % | HEIGHT: 68 IN | HEART RATE: 74 BPM | RESPIRATION RATE: 18 BRPM | BODY MASS INDEX: 44.41 KG/M2 | WEIGHT: 293 LBS | SYSTOLIC BLOOD PRESSURE: 114 MMHG

## 2019-01-12 LAB
ANION GAP SERPL CALCULATED.3IONS-SCNC: 10 MMOL/L (ref 7–16)
BASOPHILS ABSOLUTE: 0.11 E9/L (ref 0–0.2)
BASOPHILS RELATIVE PERCENT: 1 % (ref 0–2)
BUN BLDV-MCNC: 8 MG/DL (ref 6–20)
CALCIUM SERPL-MCNC: 9.2 MG/DL (ref 8.6–10.2)
CHLORIDE BLD-SCNC: 101 MMOL/L (ref 98–107)
CHOLESTEROL, TOTAL: 195 MG/DL (ref 0–199)
CO2: 28 MMOL/L (ref 22–29)
CREAT SERPL-MCNC: 0.7 MG/DL (ref 0.5–1)
EOSINOPHILS ABSOLUTE: 0.44 E9/L (ref 0.05–0.5)
EOSINOPHILS RELATIVE PERCENT: 4.1 % (ref 0–6)
GFR AFRICAN AMERICAN: >60
GFR NON-AFRICAN AMERICAN: >60 ML/MIN/1.73
GLUCOSE BLD-MCNC: 85 MG/DL (ref 74–99)
HCT VFR BLD CALC: 42.2 % (ref 34–48)
HDLC SERPL-MCNC: 56 MG/DL
HEMOGLOBIN: 13.6 G/DL (ref 11.5–15.5)
IMMATURE GRANULOCYTES #: 0.05 E9/L
IMMATURE GRANULOCYTES %: 0.5 % (ref 0–5)
LDL CHOLESTEROL CALCULATED: 122 MG/DL (ref 0–99)
LYMPHOCYTES ABSOLUTE: 3.75 E9/L (ref 1.5–4)
LYMPHOCYTES RELATIVE PERCENT: 35.1 % (ref 20–42)
MAGNESIUM: 2.1 MG/DL (ref 1.6–2.6)
MCH RBC QN AUTO: 28 PG (ref 26–35)
MCHC RBC AUTO-ENTMCNC: 32.2 % (ref 32–34.5)
MCV RBC AUTO: 86.8 FL (ref 80–99.9)
MONOCYTES ABSOLUTE: 0.91 E9/L (ref 0.1–0.95)
MONOCYTES RELATIVE PERCENT: 8.5 % (ref 2–12)
NEUTROPHILS ABSOLUTE: 5.43 E9/L (ref 1.8–7.3)
NEUTROPHILS RELATIVE PERCENT: 50.8 % (ref 43–80)
PDW BLD-RTO: 13.4 FL (ref 11.5–15)
PLATELET # BLD: 304 E9/L (ref 130–450)
PMV BLD AUTO: 10.6 FL (ref 7–12)
POTASSIUM REFLEX MAGNESIUM: 3.6 MMOL/L (ref 3.5–5)
RBC # BLD: 4.86 E12/L (ref 3.5–5.5)
SODIUM BLD-SCNC: 139 MMOL/L (ref 132–146)
TRIGL SERPL-MCNC: 86 MG/DL (ref 0–149)
VLDLC SERPL CALC-MCNC: 17 MG/DL
WBC # BLD: 10.7 E9/L (ref 4.5–11.5)

## 2019-01-12 PROCEDURE — 85025 COMPLETE CBC W/AUTO DIFF WBC: CPT

## 2019-01-12 PROCEDURE — 80048 BASIC METABOLIC PNL TOTAL CA: CPT

## 2019-01-12 PROCEDURE — 6370000000 HC RX 637 (ALT 250 FOR IP): Performed by: INTERNAL MEDICINE

## 2019-01-12 PROCEDURE — 80061 LIPID PANEL: CPT

## 2019-01-12 PROCEDURE — G0378 HOSPITAL OBSERVATION PER HR: HCPCS

## 2019-01-12 PROCEDURE — 6360000002 HC RX W HCPCS: Performed by: INTERNAL MEDICINE

## 2019-01-12 PROCEDURE — 2580000003 HC RX 258: Performed by: INTERNAL MEDICINE

## 2019-01-12 PROCEDURE — 93005 ELECTROCARDIOGRAM TRACING: CPT | Performed by: INTERNAL MEDICINE

## 2019-01-12 PROCEDURE — 36415 COLL VENOUS BLD VENIPUNCTURE: CPT

## 2019-01-12 PROCEDURE — 96376 TX/PRO/DX INJ SAME DRUG ADON: CPT

## 2019-01-12 PROCEDURE — 83735 ASSAY OF MAGNESIUM: CPT

## 2019-01-12 RX ORDER — NAPROXEN 500 MG/1
500 TABLET ORAL 2 TIMES DAILY WITH MEALS
Qty: 60 TABLET | Refills: 3 | Status: SHIPPED | OUTPATIENT
Start: 2019-01-12 | End: 2021-02-10

## 2019-01-12 RX ORDER — AZITHROMYCIN 250 MG/1
250 TABLET, FILM COATED ORAL DAILY
Qty: 10 TABLET | Refills: 0 | Status: SHIPPED | OUTPATIENT
Start: 2019-01-13 | End: 2019-01-23

## 2019-01-12 RX ADMIN — MORPHINE SULFATE 2 MG: 2 INJECTION, SOLUTION INTRAMUSCULAR; INTRAVENOUS at 05:14

## 2019-01-12 RX ADMIN — HYDROCHLOROTHIAZIDE 12.5 MG: 12.5 TABLET ORAL at 09:29

## 2019-01-12 RX ADMIN — ACETAMINOPHEN 1000 MG: 500 TABLET ORAL at 12:10

## 2019-01-12 RX ADMIN — AZITHROMYCIN 250 MG: 250 TABLET, FILM COATED ORAL at 09:28

## 2019-01-12 RX ADMIN — Medication 10 ML: at 09:30

## 2019-01-12 RX ADMIN — ASPIRIN 81 MG: 81 TABLET, COATED ORAL at 09:28

## 2019-01-12 RX ADMIN — Medication 200 MG: at 09:29

## 2019-01-12 RX ADMIN — CHOLECALCIFEROL TAB 10 MCG (400 UNIT) 800 UNITS: 10 TAB at 09:23

## 2019-01-12 RX ADMIN — ESCITALOPRAM OXALATE 10 MG: 10 TABLET ORAL at 09:23

## 2019-01-12 RX ADMIN — AMLODIPINE BESYLATE 5 MG: 5 TABLET ORAL at 09:28

## 2019-01-12 RX ADMIN — NAPROXEN 500 MG: 500 TABLET ORAL at 09:29

## 2019-01-12 ASSESSMENT — PAIN DESCRIPTION - ORIENTATION
ORIENTATION: RIGHT;LEFT;MID
ORIENTATION: MID

## 2019-01-12 ASSESSMENT — PAIN DESCRIPTION - DIRECTION: RADIATING_TOWARDS: BACK OF HEAD

## 2019-01-12 ASSESSMENT — PAIN DESCRIPTION - FREQUENCY: FREQUENCY: CONTINUOUS

## 2019-01-12 ASSESSMENT — PAIN DESCRIPTION - LOCATION
LOCATION: CHEST
LOCATION: HEAD

## 2019-01-12 ASSESSMENT — PAIN SCALES - GENERAL
PAINLEVEL_OUTOF10: 0
PAINLEVEL_OUTOF10: 6
PAINLEVEL_OUTOF10: 3
PAINLEVEL_OUTOF10: 0

## 2019-01-12 ASSESSMENT — PAIN - FUNCTIONAL ASSESSMENT: PAIN_FUNCTIONAL_ASSESSMENT: ACTIVITIES ARE NOT PREVENTED

## 2019-01-12 ASSESSMENT — PAIN DESCRIPTION - PROGRESSION: CLINICAL_PROGRESSION: GRADUALLY WORSENING

## 2019-01-12 ASSESSMENT — ENCOUNTER SYMPTOMS
VOMITING: 0
NAUSEA: 0
SHORTNESS OF BREATH: 0

## 2019-01-12 ASSESSMENT — PAIN DESCRIPTION - PAIN TYPE
TYPE: ACUTE PAIN
TYPE: OTHER (COMMENT)

## 2019-01-12 ASSESSMENT — PAIN DESCRIPTION - ONSET: ONSET: ON-GOING

## 2019-01-12 ASSESSMENT — PAIN DESCRIPTION - DESCRIPTORS: DESCRIPTORS: ACHING;DISCOMFORT;HEADACHE

## 2019-01-14 LAB
EKG ATRIAL RATE: 73 BPM
EKG P AXIS: 25 DEGREES
EKG P-R INTERVAL: 134 MS
EKG Q-T INTERVAL: 388 MS
EKG QRS DURATION: 88 MS
EKG QTC CALCULATION (BAZETT): 427 MS
EKG R AXIS: 38 DEGREES
EKG T AXIS: 23 DEGREES
EKG VENTRICULAR RATE: 73 BPM
LV EF: 71 %
LVEF MODALITY: NORMAL

## 2019-01-14 PROCEDURE — 93010 ELECTROCARDIOGRAM REPORT: CPT | Performed by: INTERNAL MEDICINE

## 2019-01-18 LAB
EKG ATRIAL RATE: 99 BPM
EKG P AXIS: 65 DEGREES
EKG P-R INTERVAL: 118 MS
EKG Q-T INTERVAL: 364 MS
EKG QRS DURATION: 84 MS
EKG QTC CALCULATION (BAZETT): 467 MS
EKG R AXIS: 98 DEGREES
EKG T AXIS: 64 DEGREES
EKG VENTRICULAR RATE: 99 BPM

## 2019-04-25 ENCOUNTER — HOSPITAL ENCOUNTER (OUTPATIENT)
Dept: NEUROLOGY | Age: 49
Discharge: HOME | End: 2019-04-25
Payer: COMMERCIAL

## 2019-04-25 PROCEDURE — 95819 EEG AWAKE AND ASLEEP: CPT

## 2019-04-25 PROCEDURE — 95819 EEG AWAKE AND ASLEEP: CPT | Performed by: PSYCHIATRY & NEUROLOGY

## 2019-04-25 NOTE — PROCEDURES
EEG Report  Vinh Myers is a 44 y.o. female      Appointment Date 4/25/2019   Appointment Time 10:00 am   Facility Location [facility] [place] [place] EEG Number [id number]   Type of Study Routine Floor Out-pt     Technical Specifications  Technician [place] [facility] of consciousness Awake/Asleep   Sleep deprived? No   Hyperventilation tested? No   Photic stim tested? Yes   EEG recording Standard 10-20 electrode placement    Duration of recording 29 min   EEG complete? Yes       Clinical History  No history provided     Medications    Current Outpatient Medications:     naproxen (NAPROSYN) 500 MG tablet, Take 1 tablet by mouth 2 times daily (with meals), Disp: 60 tablet, Rfl: 3    escitalopram (LEXAPRO) 10 MG tablet, Take 10 mg by mouth every morning , Disp: , Rfl:     acetaminophen (TYLENOL) 500 MG tablet, Take 1,000 mg by mouth every 6 hours as needed for Pain, Disp: , Rfl:     hydrochlorothiazide (HYDRODIURIL) 12.5 MG tablet, Take 12.5 mg by mouth every morning , Disp: , Rfl:     SUMAtriptan (IMITREX) 50 MG tablet, Take 50 mg by mouth once as needed for Migraine, Disp: , Rfl:     aspirin 81 MG EC tablet, Take 1 tablet by mouth 2 times daily (Patient taking differently: Take 81 mg by mouth every morning ), Disp: 60 tablet, Rfl: 5    amLODIPine (NORVASC) 5 MG tablet, Take 1 tablet by mouth daily (Patient taking differently: Take 5 mg by mouth every morning ), Disp: 30 tablet, Rfl: 3    b complex vitamins capsule, Take 1 capsule by mouth every other day , Disp: , Rfl:     vitamin D3 (CHOLECALCIFEROL) 400 units TABS tablet, Take 800 Units by mouth every morning , Disp: , Rfl:     Magnesium 100 MG CAPS, Take 2 capsules by mouth every morning , Disp: , Rfl:         Physician Interpretation    General EEG Report  9 Hz PDR. Normal transition to stage 2 sleep. Type of EEG >>  Routine, normal          General Impression  This is a normal routine EEG. No epileptiform activity or lateralizing signs are seen. MD Selena Bonilla

## 2019-05-21 ENCOUNTER — APPOINTMENT (OUTPATIENT)
Dept: GENERAL RADIOLOGY | Age: 49
End: 2019-05-21
Payer: COMMERCIAL

## 2019-05-21 ENCOUNTER — APPOINTMENT (OUTPATIENT)
Dept: CT IMAGING | Age: 49
End: 2019-05-21
Payer: COMMERCIAL

## 2019-05-21 ENCOUNTER — HOSPITAL ENCOUNTER (OUTPATIENT)
Age: 49
Discharge: HOME | End: 2019-05-21
Payer: COMMERCIAL

## 2019-05-21 ENCOUNTER — HOSPITAL ENCOUNTER (EMERGENCY)
Age: 49
Discharge: ANOTHER HOSPITAL | End: 2019-05-21
Attending: EMERGENCY MEDICINE
Payer: COMMERCIAL

## 2019-05-21 VITALS
HEART RATE: 78 BPM | WEIGHT: 293 LBS | DIASTOLIC BLOOD PRESSURE: 67 MMHG | BODY MASS INDEX: 41.95 KG/M2 | HEIGHT: 70 IN | OXYGEN SATURATION: 97 % | RESPIRATION RATE: 16 BRPM | SYSTOLIC BLOOD PRESSURE: 111 MMHG | TEMPERATURE: 98 F

## 2019-05-21 DIAGNOSIS — H93.19 TINNITUS, UNSPECIFIED LATERALITY: ICD-10-CM

## 2019-05-21 DIAGNOSIS — G89.29 CHRONIC NONINTRACTABLE HEADACHE, UNSPECIFIED HEADACHE TYPE: ICD-10-CM

## 2019-05-21 DIAGNOSIS — R42 DIZZINESS: ICD-10-CM

## 2019-05-21 DIAGNOSIS — R51.9 CHRONIC NONINTRACTABLE HEADACHE, UNSPECIFIED HEADACHE TYPE: ICD-10-CM

## 2019-05-21 DIAGNOSIS — R41.89 UNRESPONSIVE EPISODE: Primary | ICD-10-CM

## 2019-05-21 PROBLEM — I10 ESSENTIAL HYPERTENSION: Chronic | Status: ACTIVE | Noted: 2019-05-21

## 2019-05-21 PROBLEM — R07.9 CHEST PAIN: Status: RESOLVED | Noted: 2019-01-11 | Resolved: 2019-05-21

## 2019-05-21 PROBLEM — G45.0 VERTEBROBASILAR TIAS: Status: RESOLVED | Noted: 2018-04-16 | Resolved: 2019-05-21

## 2019-05-21 PROBLEM — R41.82 ALTERED MENTAL STATUS: Status: RESOLVED | Noted: 2019-05-21 | Resolved: 2019-05-21

## 2019-05-21 PROBLEM — E78.5 HYPERLIPIDEMIA LDL GOAL <100: Chronic | Status: ACTIVE | Noted: 2019-05-21

## 2019-05-21 PROBLEM — R29.810 FACIAL DROOP: Status: RESOLVED | Noted: 2018-04-06 | Resolved: 2019-05-21

## 2019-05-21 PROBLEM — R41.82 ALTERED MENTAL STATUS: Status: ACTIVE | Noted: 2019-05-21

## 2019-05-21 PROBLEM — E78.2 MIXED HYPERLIPIDEMIA: Status: RESOLVED | Noted: 2018-04-06 | Resolved: 2019-05-21

## 2019-05-21 PROBLEM — I10 HTN (HYPERTENSION): Status: RESOLVED | Noted: 2018-04-06 | Resolved: 2019-05-21

## 2019-05-21 PROBLEM — Z79.899 DRUG THERAPY: Status: RESOLVED | Noted: 2018-04-13 | Resolved: 2019-05-21

## 2019-05-21 PROBLEM — E78.00 HYPERCHOLESTEROLEMIA: Status: RESOLVED | Noted: 2018-04-16 | Resolved: 2019-05-21

## 2019-05-21 LAB
ALBUMIN SERPL-MCNC: 4.7 G/DL (ref 3.5–5.2)
ALP BLD-CCNC: 86 U/L (ref 35–104)
ALT SERPL-CCNC: 11 U/L (ref 0–32)
ANION GAP SERPL CALCULATED.3IONS-SCNC: 11 MMOL/L (ref 7–16)
APTT: 33.7 SEC (ref 24.5–35.1)
AST SERPL-CCNC: 14 U/L (ref 0–31)
BASOPHILS ABSOLUTE: 0.11 E9/L (ref 0–0.2)
BASOPHILS RELATIVE PERCENT: 0.8 % (ref 0–2)
BILIRUB SERPL-MCNC: 0.4 MG/DL (ref 0–1.2)
BUN BLDV-MCNC: 12 MG/DL (ref 6–20)
CALCIUM SERPL-MCNC: 9.7 MG/DL (ref 8.6–10.2)
CHLORIDE BLD-SCNC: 101 MMOL/L (ref 98–107)
CO2: 27 MMOL/L (ref 22–29)
CREAT SERPL-MCNC: 0.8 MG/DL (ref 0.5–1)
EOSINOPHILS ABSOLUTE: 0.32 E9/L (ref 0.05–0.5)
EOSINOPHILS RELATIVE PERCENT: 2.4 % (ref 0–6)
GFR AFRICAN AMERICAN: >60
GFR NON-AFRICAN AMERICAN: >60 ML/MIN/1.73
GLUCOSE BLD-MCNC: 95 MG/DL (ref 74–99)
HCT VFR BLD CALC: 44.6 % (ref 34–48)
HEMOGLOBIN: 14.8 G/DL (ref 11.5–15.5)
IMMATURE GRANULOCYTES #: 0.04 E9/L
IMMATURE GRANULOCYTES %: 0.3 % (ref 0–5)
INR BLD: 1.1
LACTIC ACID: 1.3 MMOL/L (ref 0.5–2.2)
LYMPHOCYTES ABSOLUTE: 3.49 E9/L (ref 1.5–4)
LYMPHOCYTES RELATIVE PERCENT: 25.9 % (ref 20–42)
MCH RBC QN AUTO: 28.1 PG (ref 26–35)
MCHC RBC AUTO-ENTMCNC: 33.2 % (ref 32–34.5)
MCV RBC AUTO: 84.6 FL (ref 80–99.9)
MONOCYTES ABSOLUTE: 0.97 E9/L (ref 0.1–0.95)
MONOCYTES RELATIVE PERCENT: 7.2 % (ref 2–12)
NEUTROPHILS ABSOLUTE: 8.54 E9/L (ref 1.8–7.3)
NEUTROPHILS RELATIVE PERCENT: 63.4 % (ref 43–80)
PDW BLD-RTO: 13.6 FL (ref 11.5–15)
PLATELET # BLD: 336 E9/L (ref 130–450)
PMV BLD AUTO: 11.2 FL (ref 7–12)
POTASSIUM SERPL-SCNC: 3.3 MMOL/L (ref 3.5–5)
PROTHROMBIN TIME: 12.4 SEC (ref 9.3–12.4)
RBC # BLD: 5.27 E12/L (ref 3.5–5.5)
SODIUM BLD-SCNC: 139 MMOL/L (ref 132–146)
TOTAL PROTEIN: 7.6 G/DL (ref 6.4–8.3)
TROPONIN: <0.01 NG/ML (ref 0–0.03)
WBC # BLD: 13.5 E9/L (ref 4.5–11.5)

## 2019-05-21 PROCEDURE — A0426 ALS 1: HCPCS

## 2019-05-21 PROCEDURE — 6360000004 HC RX CONTRAST MEDICATION: Performed by: RADIOLOGY

## 2019-05-21 PROCEDURE — 85025 COMPLETE CBC W/AUTO DIFF WBC: CPT

## 2019-05-21 PROCEDURE — 70450 CT HEAD/BRAIN W/O DYE: CPT

## 2019-05-21 PROCEDURE — 70496 CT ANGIOGRAPHY HEAD: CPT

## 2019-05-21 PROCEDURE — A0425 GROUND MILEAGE: HCPCS

## 2019-05-21 PROCEDURE — 80053 COMPREHEN METABOLIC PANEL: CPT

## 2019-05-21 PROCEDURE — 99285 EMERGENCY DEPT VISIT HI MDM: CPT

## 2019-05-21 PROCEDURE — 6370000000 HC RX 637 (ALT 250 FOR IP): Performed by: EMERGENCY MEDICINE

## 2019-05-21 PROCEDURE — 71045 X-RAY EXAM CHEST 1 VIEW: CPT

## 2019-05-21 PROCEDURE — 93005 ELECTROCARDIOGRAM TRACING: CPT | Performed by: PHYSICIAN ASSISTANT

## 2019-05-21 PROCEDURE — 85610 PROTHROMBIN TIME: CPT

## 2019-05-21 PROCEDURE — 83605 ASSAY OF LACTIC ACID: CPT

## 2019-05-21 PROCEDURE — 84484 ASSAY OF TROPONIN QUANT: CPT

## 2019-05-21 PROCEDURE — 70498 CT ANGIOGRAPHY NECK: CPT

## 2019-05-21 PROCEDURE — 85730 THROMBOPLASTIN TIME PARTIAL: CPT

## 2019-05-21 RX ORDER — ACETAMINOPHEN 325 MG/1
650 TABLET ORAL EVERY 6 HOURS PRN
Status: CANCELLED | OUTPATIENT
Start: 2019-05-21

## 2019-05-21 RX ORDER — ACETAMINOPHEN 325 MG/1
650 TABLET ORAL ONCE
Status: COMPLETED | OUTPATIENT
Start: 2019-05-21 | End: 2019-05-21

## 2019-05-21 RX ORDER — ONDANSETRON 2 MG/ML
4 INJECTION INTRAMUSCULAR; INTRAVENOUS EVERY 6 HOURS PRN
Status: CANCELLED | OUTPATIENT
Start: 2019-05-21

## 2019-05-21 RX ORDER — SUMATRIPTAN 50 MG/1
50 TABLET, FILM COATED ORAL DAILY PRN
Status: CANCELLED | OUTPATIENT
Start: 2019-05-21

## 2019-05-21 RX ORDER — SODIUM CHLORIDE 0.9 % (FLUSH) 0.9 %
10 SYRINGE (ML) INJECTION PRN
Status: CANCELLED | OUTPATIENT
Start: 2019-05-21

## 2019-05-21 RX ORDER — ASPIRIN 81 MG/1
81 TABLET ORAL EVERY MORNING
Status: CANCELLED | OUTPATIENT
Start: 2019-05-22

## 2019-05-21 RX ORDER — ESCITALOPRAM OXALATE 10 MG/1
10 TABLET ORAL EVERY MORNING
Status: CANCELLED | OUTPATIENT
Start: 2019-05-22

## 2019-05-21 RX ORDER — SODIUM CHLORIDE 0.9 % (FLUSH) 0.9 %
10 SYRINGE (ML) INJECTION EVERY 12 HOURS SCHEDULED
Status: CANCELLED | OUTPATIENT
Start: 2019-05-21

## 2019-05-21 RX ORDER — AMLODIPINE BESYLATE 5 MG/1
5 TABLET ORAL EVERY MORNING
Status: CANCELLED | OUTPATIENT
Start: 2019-05-22

## 2019-05-21 RX ORDER — HYDROCHLOROTHIAZIDE 12.5 MG/1
12.5 TABLET ORAL EVERY MORNING
Status: CANCELLED | OUTPATIENT
Start: 2019-05-22

## 2019-05-21 RX ADMIN — ACETAMINOPHEN 650 MG: 325 TABLET ORAL at 22:26

## 2019-05-21 RX ADMIN — IOPAMIDOL 90 ML: 755 INJECTION, SOLUTION INTRAVENOUS at 19:39

## 2019-05-21 ASSESSMENT — PAIN SCALES - GENERAL: PAINLEVEL_OUTOF10: 8

## 2019-05-21 NOTE — ED TRIAGE NOTES
FIRST PROVIDER CONTACT ASSESSMENT NOTE      Department of Emergency Medicine   Admit Date: No admission date for patient encounter. Chief Complaint: No chief complaint on file. History of Present Illness:    Wesly rCum is a 44 y.o. female who presents to the ED for blank stare episode that lasted about 3 minutes. Has been seen at [address] [place] [address],[address] for this by Dr. Orquidea Calle. She had muffled hearing to this. Was not responding to speaking. NIH Stroke Scale/Score at time of initial evaluation:  1A: Level of Consciousness 0 - alert; keenly responsive   1B: Ask Month and Age 0 - answers both questions correctly   1C:  Tell Patient To Open and Close Eyes, then Hand  Squeeze 0 - performs both tasks correctly   2: Test Horizontal Extraocular Movements 0 - normal   3: Test Visual Fields 0 - no visual loss   4: Test Facial Palsy 0 - normal symmetric movement   5A: Test Left Arm Motor Drift 0 - no drift, limb holds 90 (or 45) degrees for full 10 seconds   5B: Test Right Arm Motor Drift 0 - no drift, limb holds 90 (or 45) degrees for full 10 seconds   6A: Test Left Leg Motor Drift 0 - no drift; leg holds 30 degree position for full 5 seconds   6B: Test Right Leg Motor Drift 0 - no drift; leg holds 30 degree position for full 5 seconds   7: Test Limb Ataxia   (FNF/Heel-Shin) 1 - present in one limb   8: Test Sensation 1 - mild to moderate sensory loss; patient feels pinprick is less sharp or is dull on the affected side; there is a loss of superficial pain with pinprick but patient is aware of being touched    9: Test Language/Aphasia 0 - no aphasia, normal   10: Test Dysarthria 0 - normal   11: Test Extinction/Inattention 0 - no abnormality   Total Score: 1   5/21/19 at 5:08 PM.      Acute CVA Core Measures:      - t-PA Eligibility: IV t-PA was considered and not given due to violations in inclusion criteria including mild/rapidly resolving deficit          -----------------END OF FIRST PROVIDER CONTACT ASSESSMENT NOTE--------------  Electronically signed by LILIANA Fay   DD: 5/21/19

## 2019-05-21 NOTE — ED PROVIDER NOTES
Department of Emergency Medicine   ED  Provider Note  Admit Date/RoomTime: 5/21/2019  5:15 PM  ED Room: 02/02 5/21/19  5:25 PM        HISTORY OF PRESENT ILLNESS:  (Nurses Notes Reviewed)    Chief Complaint:   Dizziness (hx of stroke, having \"blanking out\" spells. longest was 3 minutes ) and Headache      Source of history provided by:  patient. History/Exam Limitations: none. Bina Anthony is a 44 y.o. old female presenting to the emergency department by shasha, with gradual onset of symptoms at physical therapy, which began 4pm.  Last known well time: prior to 4pm.  The episode occurred at home. Since recognized the symptoms have been improving. She has stroke risk factors of: previous stroke 1 year ago w residual left sided weakness arm and weakness. There have been associated symptoms of ringing in ears and dizzyness. There has been no history of recent trauma. Pt is 43 yo f who [facility] and was \"out of it\" had ringing in left ear, and dizzyness, pt was unable to answer questions correctly and pt was spaced out and not anserwing questions as well and was not unconscious per family. Last episode was April 2nd which lasted for a few seconds, today sx last 3 monutes. / tehre was no reported sz activity. Pt denied any trauma at PT/. No reported physical weakness, pt only complaiing of HA at this time, pts family notes sx resolved. / family member notes pt was recently started on topamax which is being titrated up. Code Status on file: Prior. NIH Stroke Scale at time of initial evaluation: zero  NIH/MNHISS Stroke Scale  Interval: Baseline  Level of Consciousness (1a. ): Alert  LOC Questions (1b. ):  Answers both correctly  LOC Commands (1c. ): Obeys both correctly  Best Gaze (2. ): Normal  Visual (3. ): No visual loss  Facial Palsy (4. ): Normal  Motor Arm, Left (5a. ): No drift  Motor Arm, Right (5b. ): No drift  Motor Leg, Left (6a. ): No drift  Motor Leg, Right (6b. ): No drift  Limb Ataxia (7. ): (!) Present in one limb  Sensory (8. ): (!) Partial loss  Best Language (9. ): No aphasia  Dysarthria (10. ): Normal  Extinction and Inattention (11): No neglect  Total: 2      Past Medical History:  has a past medical history of Arthritis, Headache, Hypertension, Left-sided headache, Left-sided weakness, Numbness, TIA (transient ischemic attack), and Trouble swallowing. Past Surgical History:  has a past surgical history that includes Breast surgery; Tonsillectomy and adenoidectomy; Endometrial ablation; and Adrenalectomy. Social History:  reports that she has never smoked. She has never used smokeless tobacco. She reports that she does not drink alcohol or use drugs. Prior Functional Status(Modified Millie Scale): 2=Slight disability; unable to carry out all previous activities, but able to look after own affairs without assistance    Family History: family history includes Allergy (Severe) in her father; Arthritis in her mother; Bleeding Prob in her father; Diabetes in her mother; Heart Disease in her father; Thyroid Disease in her father. The patients home medications have been reviewed. Prior to Admission medications    Medication Sig Start Date End Date Taking?  Authorizing Provider   naproxen (NAPROSYN) 500 MG tablet Take 1 tablet by mouth 2 times daily (with meals) 1/12/19   Krystina Rowley,    escitalopram (LEXAPRO) 10 MG tablet Take 10 mg by mouth every morning     Historical Provider, MD   acetaminophen (TYLENOL) 500 MG tablet Take 1,000 mg by mouth every 6 hours as needed for Pain    Historical Provider, MD   hydrochlorothiazide (HYDRODIURIL) 12.5 MG tablet Take 12.5 mg by mouth every morning  4/10/18   Historical Provider, MD   SUMAtriptan (IMITREX) 50 MG tablet Take 50 mg by mouth once as needed for Migraine    Historical Provider, MD   aspirin 81 MG EC tablet Take 1 tablet by mouth 2 times daily  Patient taking differently: Take 81 mg by mouth every morning  4/13/18   Bj Montes MD   amLODIPine (NORVASC) 5 MG tablet Take 1 tablet by mouth daily  Patient taking differently: Take 5 mg by mouth every morning  4/8/18   Kayla Vazquez MD   b complex vitamins capsule Take 1 capsule by mouth every other day     Historical Provider, MD   vitamin D3 (CHOLECALCIFEROL) 400 units TABS tablet Take 800 Units by mouth every morning     Historical Provider, MD   Magnesium 100 MG CAPS Take 2 capsules by mouth every morning     Historical Provider, MD       Allergies: Bee venom and Sudafed [pseudoephedrine hcl]       Review of Systems:   Pertinent positives and negatives are stated within HPI, all other systems reviewed and are negative.    ---------------------------------------------------PHYSICAL EXAM--------------------------------------    Constitutional/General: Alert and oriented x3, well appearing, non toxic in NAD  Head: Normocephalic and atraumatic  Eyes: PERRL, EOMI, NORMAL VISUAL FIELDS  Mouth: Oropharynx clear, handling secretions, no trismus. NO facial droop  Neck: Supple, full ROM, non tender to palpation in the midline, no stridor, no crepitus, no meningeal signs  Pulmonary:CTA b/l Not in respiratory distress  Cardiovascular:  s1 s2 Regular rate. Regular rhythm. No murmurs, gallops, or rubs. 2+ distal pulses  Chest: no chest wall tenderness  Abdomen: Soft. Non tender. Non distended. +BS. No rebound, guarding, or rigidity. No pulsatile masses appreciated. Musculoskeletal: Moves all extremities x 4. Warm and well perfused, no clubbing, cyanosis, or edema. Capillary refill <3 seconds  Skin: warm and dry. No rashes. Neurologic: GCS 15, CN 2-12 grossly intact, no focal deficits, symmetric strength 5/5 in the upper and lower extremities bilaterally. Speech fluid and normal. Normal finger to nose. NO drift, heal to shin normal, VF normal.  Please also see NIH stroke scale.   NIHSS= zero  Psych: Normal Affect      -------------------------------------------------- RESULTS -------------------------------------------------  All laboratory and imaging studies have been reviewed by myself    LABS:  Results for orders placed or performed during the hospital encounter of 05/21/19   CBC Auto Differential   Result Value Ref Range    WBC 13.5 (H) 4.5 - 11.5 E9/L    RBC 5.27 3.50 - 5.50 E12/L    Hemoglobin 14.8 11.5 - 15.5 g/dL    Hematocrit 44.6 34.0 - 48.0 %    MCV 84.6 80.0 - 99.9 fL    MCH 28.1 26.0 - 35.0 pg    MCHC 33.2 32.0 - 34.5 %    RDW 13.6 11.5 - 15.0 fL    Platelets 058 800 - 650 E9/L    MPV 11.2 7.0 - 12.0 fL    Neutrophils % 63.4 43.0 - 80.0 %    Immature Granulocytes % 0.3 0.0 - 5.0 %    Lymphocytes % 25.9 20.0 - 42.0 %    Monocytes % 7.2 2.0 - 12.0 %    Eosinophils % 2.4 0.0 - 6.0 %    Basophils % 0.8 0.0 - 2.0 %    Neutrophils # 8.54 (H) 1.80 - 7.30 E9/L    Immature Granulocytes # 0.04 E9/L    Lymphocytes # 3.49 1.50 - 4.00 E9/L    Monocytes # 0.97 (H) 0.10 - 0.95 E9/L    Eosinophils # 0.32 0.05 - 0.50 E9/L    Basophils # 0.11 0.00 - 0.20 E9/L   Comprehensive Metabolic Panel   Result Value Ref Range    Sodium 139 132 - 146 mmol/L    Potassium 3.3 (L) 3.5 - 5.0 mmol/L    Chloride 101 98 - 107 mmol/L    CO2 27 22 - 29 mmol/L    Anion Gap 11 7 - 16 mmol/L    Glucose 95 74 - 99 mg/dL    BUN 12 6 - 20 mg/dL    CREATININE 0.8 0.5 - 1.0 mg/dL    GFR Non-African American >60 >=60 mL/min/1.73    GFR African American >60     Calcium 9.7 8.6 - 10.2 mg/dL    Total Protein 7.6 6.4 - 8.3 g/dL    Alb 4.7 3.5 - 5.2 g/dL    Total Bilirubin 0.4 0.0 - 1.2 mg/dL    Alkaline Phosphatase 86 35 - 104 U/L    ALT 11 0 - 32 U/L    AST 14 0 - 31 U/L   Lactic Acid, Plasma   Result Value Ref Range    Lactic Acid 1.3 0.5 - 2.2 mmol/L   Troponin   Result Value Ref Range    Troponin <0.01 0.00 - 0.03 ng/mL   APTT   Result Value Ref Range    aPTT 33.7 24.5 - 35.1 sec   Protime-INR   Result Value Ref Range    Protime 12.4 9.3 - 12.4 sec INR 1.1    EKG 12 Lead   Result Value Ref Range    Ventricular Rate 75 BPM    Atrial Rate 75 BPM    P-R Interval 128 ms    QRS Duration 88 ms    Q-T Interval 366 ms    QTc Calculation (Bazett) 408 ms    P Axis 23 degrees    R Axis 63 degrees    T Axis 34 degrees       RADIOLOGY:  Interpreted by Radiologist.  CTA HEAD W CONTRAST   Final Result      Patent intracranial circulation. Patent extracranial circulation. Retropharyngeal course of bilateral ICAs. CTA NECK W CONTRAST   Final Result      Patent intracranial circulation. Patent extracranial circulation. Retropharyngeal course of bilateral ICAs. XR CHEST PORTABLE   Final Result      No airspace opacities or pleural effusion. CT Head WO Contrast   Final Result   No significant abnormal findings. EKG Interpretation  Interpreted by emergency department physician. 21-MAY-2019 17:12:36  Rhythm: normal sinus   Rate: 75  Axis: normal  Conduction: normal  ST Segments: no acute change  T Waves: no acute change    Clinical Impression: no acute changes  Comparison to Old EKG  previous          ------------------------- NURSING NOTES AND VITALS REVIEWED ---------------------------   The nursing notes within the ED encounter and vital signs as below have been reviewed. /82   Pulse 84   Temp 98 °F (36.7 °C) (Oral)   Resp 16   Ht 5' 10\" (1.778 m)   Wt 300 lb (136.1 kg)   LMP 02/13/2018   SpO2 97%   BMI 43.05 kg/m²   Oxygen Saturation Interpretation: Normal    The patients available past medical records and past encounters were reviewed. ------------------------------ ED COURSE/MEDICAL DECISION MAKING----------------------  Medications   iopamidol (ISOVUE-370) 76 % injection 90 mL (90 mLs Intravenous Given 5/21/19 1939)   acetaminophen (TYLENOL) tablet 650 mg (650 mg Oral Given 5/21/19 2226)       Medical Decision Making:    Patient's been having these brief unresponsive episodes.  Today she had a prolonged episode. She is actually normal now and her baseline. I did discuss with her primary care physician both of us feel that likely she is having subclinical seizures. She has seen a neurologist in [facility] who I called but could not get ahold of today. Patient has a normal neurological exam at this time we've been observing her for a while in the ER. After discussion with her primary care doctor we decided [facility] the [facility]     Re-Evaluations:             Re-evaluation. Patients symptoms resolved    This patient's ED course included: a personal history and physicial examination, re-evaluation prior to disposition, cardiac monitoring and a personal history and physicial eaxmination    This patient has remained hemodynamically stable during their ED course. Consultations:   D/w bal           D/w dr Alicia Aguilera       --------------------------------- IMPRESSION AND DISPOSITION ---------------------------------    IMPRESSION  1. Unresponsive episode    2. Dizziness    3. Tinnitus, unspecified laterality    4.  Chronic nonintractable headache, unspecified headache type        DISPOSITION  Disposition: Transfer to [facility] to monitored floor  Patient condition is [address] [place] [address],   05/21/19 6864

## 2019-05-22 ENCOUNTER — HOSPITAL ENCOUNTER (OUTPATIENT)
Age: 49
Setting detail: OBSERVATION
Discharge: HOME | End: 2019-05-22
Attending: INTERNAL MEDICINE | Admitting: INTERNAL MEDICINE
Payer: COMMERCIAL

## 2019-05-22 VITALS
RESPIRATION RATE: 18 BRPM | WEIGHT: 293 LBS | BODY MASS INDEX: 41.95 KG/M2 | HEIGHT: 70 IN | SYSTOLIC BLOOD PRESSURE: 130 MMHG | HEART RATE: 75 BPM | OXYGEN SATURATION: 98 % | DIASTOLIC BLOOD PRESSURE: 70 MMHG | TEMPERATURE: 98.1 F

## 2019-05-22 PROBLEM — R41.82 ALTERED MENTAL STATUS: Status: ACTIVE | Noted: 2019-05-22

## 2019-05-22 LAB
EKG ATRIAL RATE: 75 BPM
EKG P AXIS: 23 DEGREES
EKG P-R INTERVAL: 128 MS
EKG Q-T INTERVAL: 366 MS
EKG QRS DURATION: 88 MS
EKG QTC CALCULATION (BAZETT): 408 MS
EKG R AXIS: 63 DEGREES
EKG T AXIS: 34 DEGREES
EKG VENTRICULAR RATE: 75 BPM

## 2019-05-22 PROCEDURE — 6370000000 HC RX 637 (ALT 250 FOR IP): Performed by: PSYCHIATRY & NEUROLOGY

## 2019-05-22 PROCEDURE — G0378 HOSPITAL OBSERVATION PER HR: HCPCS

## 2019-05-22 PROCEDURE — 6360000002 HC RX W HCPCS: Performed by: INTERNAL MEDICINE

## 2019-05-22 PROCEDURE — 96372 THER/PROPH/DIAG INJ SC/IM: CPT

## 2019-05-22 PROCEDURE — 93010 ELECTROCARDIOGRAM REPORT: CPT | Performed by: INTERNAL MEDICINE

## 2019-05-22 PROCEDURE — 6370000000 HC RX 637 (ALT 250 FOR IP): Performed by: INTERNAL MEDICINE

## 2019-05-22 PROCEDURE — 99253 IP/OBS CNSLTJ NEW/EST LOW 45: CPT | Performed by: PSYCHIATRY & NEUROLOGY

## 2019-05-22 PROCEDURE — 2580000003 HC RX 258: Performed by: INTERNAL MEDICINE

## 2019-05-22 RX ORDER — TOPIRAMATE 25 MG/1
25 TABLET ORAL
COMMUNITY

## 2019-05-22 RX ORDER — SODIUM CHLORIDE 0.9 % (FLUSH) 0.9 %
10 SYRINGE (ML) INJECTION PRN
Status: DISCONTINUED | OUTPATIENT
Start: 2019-05-22 | End: 2019-05-22 | Stop reason: HOSPADM

## 2019-05-22 RX ORDER — TOPIRAMATE 25 MG/1
50 TABLET ORAL NIGHTLY
COMMUNITY
End: 2021-02-10

## 2019-05-22 RX ORDER — ASPIRIN 81 MG/1
81 TABLET ORAL EVERY MORNING
Status: DISCONTINUED | OUTPATIENT
Start: 2019-05-22 | End: 2019-05-22 | Stop reason: HOSPADM

## 2019-05-22 RX ORDER — AMLODIPINE BESYLATE 5 MG/1
5 TABLET ORAL EVERY MORNING
Status: DISCONTINUED | OUTPATIENT
Start: 2019-05-22 | End: 2019-05-22 | Stop reason: HOSPADM

## 2019-05-22 RX ORDER — SUMATRIPTAN 50 MG/1
50 TABLET, FILM COATED ORAL DAILY PRN
Status: DISCONTINUED | OUTPATIENT
Start: 2019-05-22 | End: 2019-05-22 | Stop reason: HOSPADM

## 2019-05-22 RX ORDER — SODIUM CHLORIDE 0.9 % (FLUSH) 0.9 %
10 SYRINGE (ML) INJECTION EVERY 12 HOURS SCHEDULED
Status: DISCONTINUED | OUTPATIENT
Start: 2019-05-22 | End: 2019-05-22 | Stop reason: HOSPADM

## 2019-05-22 RX ORDER — TOPIRAMATE 25 MG/1
25 TABLET ORAL 2 TIMES DAILY
Status: DISCONTINUED | OUTPATIENT
Start: 2019-05-22 | End: 2019-05-22 | Stop reason: HOSPADM

## 2019-05-22 RX ORDER — ACETAMINOPHEN 325 MG/1
650 TABLET ORAL EVERY 6 HOURS PRN
Status: DISCONTINUED | OUTPATIENT
Start: 2019-05-22 | End: 2019-05-22 | Stop reason: HOSPADM

## 2019-05-22 RX ORDER — TOPIRAMATE 25 MG/1
25 TABLET ORAL 2 TIMES DAILY
Status: DISCONTINUED | OUTPATIENT
Start: 2019-05-22 | End: 2019-05-22

## 2019-05-22 RX ORDER — ONDANSETRON 2 MG/ML
4 INJECTION INTRAMUSCULAR; INTRAVENOUS EVERY 6 HOURS PRN
Status: DISCONTINUED | OUTPATIENT
Start: 2019-05-22 | End: 2019-05-22 | Stop reason: HOSPADM

## 2019-05-22 RX ORDER — ESCITALOPRAM OXALATE 10 MG/1
10 TABLET ORAL EVERY MORNING
Status: DISCONTINUED | OUTPATIENT
Start: 2019-05-22 | End: 2019-05-22 | Stop reason: HOSPADM

## 2019-05-22 RX ORDER — HYDROCHLOROTHIAZIDE 12.5 MG/1
12.5 TABLET ORAL EVERY MORNING
Status: DISCONTINUED | OUTPATIENT
Start: 2019-05-22 | End: 2019-05-22 | Stop reason: HOSPADM

## 2019-05-22 RX ADMIN — Medication 10 ML: at 08:54

## 2019-05-22 RX ADMIN — TOPIRAMATE 25 MG: 25 TABLET, FILM COATED ORAL at 15:41

## 2019-05-22 RX ADMIN — ENOXAPARIN SODIUM 40 MG: 40 INJECTION SUBCUTANEOUS at 08:54

## 2019-05-22 RX ADMIN — ESCITALOPRAM OXALATE 10 MG: 10 TABLET, FILM COATED ORAL at 08:54

## 2019-05-22 RX ADMIN — HYDROCHLOROTHIAZIDE 12.5 MG: 12.5 TABLET ORAL at 08:54

## 2019-05-22 RX ADMIN — ASPIRIN 81 MG: 81 TABLET ORAL at 08:54

## 2019-05-22 RX ADMIN — ACETAMINOPHEN 650 MG: 325 TABLET, FILM COATED ORAL at 15:41

## 2019-05-22 RX ADMIN — AMLODIPINE BESYLATE 5 MG: 5 TABLET ORAL at 08:54

## 2019-05-22 RX ADMIN — ACETAMINOPHEN 650 MG: 325 TABLET, FILM COATED ORAL at 07:45

## 2019-05-22 ASSESSMENT — PAIN SCALES - GENERAL
PAINLEVEL_OUTOF10: 10
PAINLEVEL_OUTOF10: 0
PAINLEVEL_OUTOF10: 6

## 2019-05-22 NOTE — H&P
[address] [facility]  History and Physical      CHIEF COMPLAINT:  AMS      HISTORY OF PRESENT ILLNESS:      The patient is a 44 y.o. female patient of Dr Mali Blake who presents with AMS. Per ED around 4pm at [facility] therapy she became confused and was \"out of it\" had ringing in left ear, and dizzyness, pt was unable to answer questions correctly and pt was spaced out and not anserwing questions as well and was not unconscious per family. Last episode was April 2nd which lasted for a few seconds, today sx last 3 monutes. / tehre was no reported sz activity. Pt denied any trauma at PT/. No reported physical weakness, pt only complaiing of HA at this time, pts family notes sx resolved. / family member notes pt was recently started on topamax which is being titrated up. No exac or relief.          Past Medical History:    Past Medical History:   Diagnosis Date    Arthritis     Headache     LEFT SIDE LAST 2 DAYS    Hypertension     Left-sided headache     Left-sided weakness     ESPECIALLY FINE MOTOR/ARM    Numbness     UPPER LEG & ARM ON LEFT SIDE    TIA (transient ischemic attack) 04/06/2018    Trouble swallowing     THICK OR DRY FOODS       Past Surgical History:    Past Surgical History:   Procedure Laterality Date    ADRENALECTOMY      BREAST SURGERY      ENDOMETRIAL ABLATION      TONSILLECTOMY AND ADENOIDECTOMY         Medications Prior to Admission:    Medications Prior to Admission: topiramate (TOPAMAX) 25 MG tablet, Take 25 mg by mouth every morning (before breakfast)  topiramate (TOPAMAX) 25 MG tablet, Take 50 mg by mouth nightly  escitalopram (LEXAPRO) 10 MG tablet, Take 10 mg by mouth every morning   acetaminophen (TYLENOL) 500 MG tablet, Take 1,000 mg by mouth every 6 hours as needed for Pain  hydrochlorothiazide (HYDRODIURIL) 12.5 MG tablet, Take 12.5 mg by mouth every morning   aspirin 81 MG EC tablet, Take 1 tablet by mouth 2 times daily (Patient taking differently: Take 81 mg by mouth every morning )  amLODIPine (NORVASC) 5 MG tablet, Take 1 tablet by mouth daily (Patient taking differently: Take 5 mg by mouth every morning )  vitamin D3 (CHOLECALCIFEROL) 400 units TABS tablet, Take 800 Units by mouth every morning   Magnesium 100 MG CAPS, Take 2 capsules by mouth every morning   naproxen (NAPROSYN) 500 MG tablet, Take 1 tablet by mouth 2 times daily (with meals)  SUMAtriptan (IMITREX) 50 MG tablet, Take 50 mg by mouth once as needed for Migraine  b complex vitamins capsule, Take 1 capsule by mouth every other day     Allergies:    Bee venom and Sudafed [pseudoephedrine hcl]    Social History:    reports that she has never smoked. She has never used smokeless tobacco. She reports that she does not drink alcohol or use drugs. Family History:   family history includes Allergy (Severe) in her father; Arthritis in her mother; Bleeding Prob in her father; Diabetes in her mother; Heart Disease in her father; Thyroid Disease in her father. REVIEW OF SYSTEMS:  As above in the HPI, otherwise negative    PHYSICAL EXAM:    Vitals:  /71   Pulse 75   Temp 97.5 °F (36.4 °C) (Oral)   Resp 16   Ht 5' 10\" (1.778 m)   Wt 300 lb (136.1 kg)   LMP 02/13/2018   SpO2 99%   BMI 43.05 kg/m²     General:  Awake, alert, oriented X 3. Well developed, well nourished, well groomed. No apparent distress. HEENT:  Normocephalic, atraumatic. Pupils equal, round, reactive to light. No scleral icterus. No conjunctival injection. Normal lips, teeth, and gums. No nasal discharge. Neck:  Supple  Heart:  RRR, no murmurs, gallops, rubs  Lungs:  CTA bilaterally, bilat symmetrical expansion, no wheeze, rales, or rhonchi  Abdomen:   Bowel sounds present, soft, nontender, no masses, no organomegaly, no peritoneal signs  Extremities:  No clubbing, cyanosis, or edema  Skin:  Warm and dry, no open lesions or rash  Neuro:  Cranial nerves 2-12 intact, no focal deficits  Breast: deferred  Rectal: deferred  Genitalia:  deferred    LABS:    CBC with Differential:    Lab Results   Component Value Date    WBC 13.5 05/21/2019    RBC 5.27 05/21/2019    HGB 14.8 05/21/2019    HCT 44.6 05/21/2019     05/21/2019    MCV 84.6 05/21/2019    MCH 28.1 05/21/2019    MCHC 33.2 05/21/2019    RDW 13.6 05/21/2019    LYMPHOPCT 25.9 05/21/2019    MONOPCT 7.2 05/21/2019    BASOPCT 0.8 05/21/2019    MONOSABS 0.97 05/21/2019    LYMPHSABS 3.49 05/21/2019    EOSABS 0.32 05/21/2019    BASOSABS 0.11 05/21/2019     CMP:    Lab Results   Component Value Date     05/21/2019    K 3.3 05/21/2019    K 3.6 01/12/2019     05/21/2019    CO2 27 05/21/2019    BUN 12 05/21/2019    CREATININE 0.8 05/21/2019    GFRAA >60 05/21/2019    LABGLOM >60 05/21/2019    GLUCOSE 95 05/21/2019    PROT 7.6 05/21/2019    LABALBU 4.7 05/21/2019    CALCIUM 9.7 05/21/2019    BILITOT 0.4 05/21/2019    ALKPHOS 86 05/21/2019    AST 14 05/21/2019    ALT 11 05/21/2019     Magnesium:    Lab Results   Component Value Date    MG 2.1 01/12/2019     Phosphorus:  No results found for: PHOS  PT/INR:    Lab Results   Component Value Date    PROTIME 12.4 05/21/2019    INR 1.1 05/21/2019     Troponin:    Lab Results   Component Value Date    TROPONINI <0.01 05/21/2019     U/A:  No results found for: NITRITE, COLORU, PROTEINU, PHUR, LABCAST, WBCUA, RBCUA, MUCUS, TRICHOMONAS, YEAST, BACTERIA, CLARITYU, SPECGRAV, LEUKOCYTESUR, UROBILINOGEN, BILIRUBINUR, BLOODU, GLUCOSEU, AMORPHOUS  HgBA1c:    Lab Results   Component Value Date    LABA1C 5.0 04/07/2018     FLP:    Lab Results   Component Value Date    TRIG 86 01/12/2019    HDL 56 01/12/2019    LDLCALC 122 01/12/2019    LABVLDL 17 01/12/2019       No orders to display       ASSESSMENT:      Principal Problem:    Altered mental status  Active Problems:     Morbid obesity with BMI of 40.0-44.9, adult ([facility] [address]    Essential hypertension    Hyperlipidemia LDL goal <100  Resolved Problems:    Altered mental status      PLAN:    Admit  Seizure precautions  MRI  EEG  AEDs  Neuro consult  Medications for other co morbidities cont as appropriate w dosage adjustments as necessary  PT/OT  DVT PPx  DC planning discussed with neurology. Patient can be discharged home to follow-up with the [facility] as an outpatient.             Electronically signed by León Crisostomo MD on 5/22/2019 at 9:30 AM

## 2019-05-22 NOTE — CARE COORDINATION
Spoke with patient and her mother, eLo Cervantes who is at bedside. Patient lives at home with her spouse and reports being independent. Plan is for her to return home, and she states no needs at this time.   Will follow

## 2019-05-22 NOTE — ED NOTES
NURSE TO NURSE PROVIDED TO FLO AT [facility], [address]     [facility] [address]  05/21/19 3414

## 2019-05-22 NOTE — PROGRESS NOTES
2231 Dr Andrei Gonzalez called and spoke with Dr. Sravanthi Steiner at this time, Dr. Andrei Gonzalez has accepted the patient to [facility] at this time.

## 2019-05-22 NOTE — PLAN OF CARE
Problem: HEMODYNAMIC STATUS  Goal: Patient has stable vital signs and fluid balance  Outcome: Met This Shift     Problem: ACTIVITY INTOLERANCE/IMPAIRED MOBILITY  Goal: Mobility/activity is maintained at optimum level for patient  Outcome: Met This Shift

## 2019-05-22 NOTE — CONSULTS
Neurology was consulted for seizures    Past Medical History:     Past Medical History:   Diagnosis Date    Arthritis     Headache     LEFT SIDE LAST 2 DAYS    Hypertension     Left-sided headache     Left-sided weakness     ESPECIALLY FINE MOTOR/ARM    Numbness     UPPER LEG & ARM ON LEFT SIDE    TIA (transient ischemic attack) 04/06/2018    Trouble swallowing     THICK OR DRY FOODS       Past Surgical History:     Past Surgical History:   Procedure Laterality Date    ADRENALECTOMY      BREAST SURGERY      ENDOMETRIAL ABLATION      TONSILLECTOMY AND ADENOIDECTOMY         Allergies:     Bee venom and Sudafed [pseudoephedrine hcl]    Medications:     Prior to Admission medications    Medication Sig Start Date End Date Taking?  Authorizing Provider   topiramate (TOPAMAX) 25 MG tablet Take 25 mg by mouth every morning (before breakfast)   Yes Historical Provider, MD   topiramate (TOPAMAX) 25 MG tablet Take 50 mg by mouth nightly   Yes Historical Provider, MD   escitalopram (LEXAPRO) 10 MG tablet Take 10 mg by mouth every morning    Yes Historical Provider, MD   acetaminophen (TYLENOL) 500 MG tablet Take 1,000 mg by mouth every 6 hours as needed for Pain   Yes Historical Provider, MD   hydrochlorothiazide (HYDRODIURIL) 12.5 MG tablet Take 12.5 mg by mouth every morning  4/10/18  Yes Historical Provider, MD   aspirin 81 MG EC tablet Take 1 tablet by mouth 2 times daily  Patient taking differently: Take 81 mg by mouth every morning  4/13/18  Yes Anand Moses MD   amLODIPine (NORVASC) 5 MG tablet Take 1 tablet by mouth daily  Patient taking differently: Take 5 mg by mouth every morning  4/8/18  Yes Roxy Martinez MD   vitamin D3 (CHOLECALCIFEROL) 400 units TABS tablet Take 800 Units by mouth every morning    Yes Historical Provider, MD   Magnesium 100 MG CAPS Take 2 capsules by mouth every morning    Yes Historical Provider, MD   naproxen (NAPROSYN) 500 MG tablet Take 1 tablet by mouth 2 times daily (with meals) 1/12/19   Krystina Rowley, DO   SUMAtriptan (IMITREX) 50 MG tablet Take 50 mg by mouth once as needed for Migraine    Historical Provider, MD   b complex vitamins capsule Take 1 capsule by mouth every other day     Historical Provider, MD       Social History:     Denies ETOH, tobacco, or illicit drugs    Review of Systems:     No chest pain or palpitations  No SOB  No vertigo, lightheadedness or loss of consciousness  No falls, tripping or stumbling  No incontinence of bowels or bladder  No itching or bruising appreciated  No numbness, tingling or focal arm/leg weakness    ROS is otherwise negative     Family History:     Family History   Problem Relation Age of Onset    Diabetes Mother     Arthritis Mother     Heart Disease Father     Thyroid Disease Father     Allergy (Severe) Father     Bleeding Prob Father         History of Present Illness:     44year old female with PMH of CVA, HTN presented to the ER after tereza had an episode of dizziness, blanking spell, confusion for almost 3 minutes. SHe had the episode while she was at physical therapy for vestibilar therapy. She had a CVA in April 2018 with residual left sided weakness. However no signs of stroke was seen with MRI at that time. She has chronic daily headaches since that time. She is following with neurology at [facility]. She had extensive w/u including MRI brain, MRA head and neck, EEG, ECHO and all have been unremarkable so far. SHe had a similar blanking episode almost a month ago again at physical therapy. She is started on topamax by her neurologist abd getting help of her headache symptoms. She has associated dizziness and ringing ears with the episode. She was advised to get referred to [facility] in the ER but refused. She is AAOx4 this am and denies any active complaints. Er course: Vitals stable. NIH Stroke Scale two with residual symptoms. Ct head, cta head and neck neg. Mild leukocytosis. ekg nsr.      Objective:     BP 119/71   Pulse 75   Temp 97.5 °F (36.4 °C) (Oral)   Resp 16   Ht 5' 10\" (1.778 m)   Wt 300 lb (136.1 kg)   LMP 02/13/2018   SpO2 99%   BMI 43.05 kg/m²     General appearance: alert, appears stated age, cooperative and no distress  Head: normocephalic, without obvious abnormality, atraumatic  Eyes: conjunctivae/corneas clear.  .  Neck: no adenopathy, no carotid bruit, supple, symmetrical, trachea midline and thyroid not enlarged, symmetric, no tenderness/mass/nodules  Lungs: clear to auscultation bilaterally  Heart: regular rate and rhythm, S1, S2 normal, no murmur, click, rub or gallop  Extremities: normal, atraumatic, no cyanosis or edema  Pulses: 2+ and symmetric  Skin: color, texture, turgor normal---no rashes or lesions    Mental Status: alert, oriented, thought content appropriate    Appropriate attention/concentration  Intact fundus of knowledge  Memories intact    Speech: no dysarthria  Language: no aphasias    Cranial Nerves:  I: smell    II: visual acuity     II: visual fields Full    II: pupils ELOY   III,VII: ptosis None   III,IV,VI: extraocular muscles  EOMI without nystagmus    V: mastication Normal   V: facial light touch sensation  Normal   V,VII: corneal reflex  Present   VII: facial muscle function - upper  Normal   VII: facial muscle function - lower Normal   VIII: hearing Normal   IX: soft palate elevation  Normal   IX,X: gag reflex Present   XI: trapezius strength  5/5   XI: sternocleidomastoid strength 5/5   XI: neck extension strength  5/5   XII: tongue strength  Normal     Motor:  Right   5/5              Left   5-/5               Right Bicep  5/5           Left Bicep  5-/5              Right Triceps   5/5       Left Triceps  5-/5          Right Deltoid  5/5     Left Deltoid  5/5         Right IPS  5/5            Left IPS  5/5               Right Quadriceps  5/5          Left Quadriceps    5/5           Right Gastrocnemius    5/5    Left Gastrocnemius   5/5  Right Ant Tibialis

## 2019-05-22 NOTE — PROGRESS NOTES
2055  Called access center for transfer to [address] [place] [address] for Dr. Gaurav Campo for poss seizures at this time  2125  Patient does not want to go to [address] [place] [address]  She would like to go to [address] [place] [address]  2125 Called access center for transfer to [address] [place] [address] for Dr. Gaurav Campo, awaiting a phone call for an accepting physician

## 2020-03-13 ENCOUNTER — HOSPITAL ENCOUNTER (EMERGENCY)
Age: 50
Discharge: HOME | End: 2020-03-14
Attending: EMERGENCY MEDICINE
Payer: COMMERCIAL

## 2020-03-13 ENCOUNTER — APPOINTMENT (OUTPATIENT)
Dept: CT IMAGING | Age: 50
End: 2020-03-13
Payer: COMMERCIAL

## 2020-03-13 ENCOUNTER — APPOINTMENT (OUTPATIENT)
Dept: GENERAL RADIOLOGY | Age: 50
End: 2020-03-13
Payer: COMMERCIAL

## 2020-03-13 LAB
ALBUMIN SERPL-MCNC: 4.4 G/DL (ref 3.5–5.2)
ALP BLD-CCNC: 79 U/L (ref 35–104)
ALT SERPL-CCNC: 9 U/L (ref 0–32)
ANION GAP SERPL CALCULATED.3IONS-SCNC: 12 MMOL/L (ref 7–16)
AST SERPL-CCNC: 13 U/L (ref 0–31)
BASOPHILS ABSOLUTE: 0.09 E9/L (ref 0–0.2)
BASOPHILS RELATIVE PERCENT: 0.7 % (ref 0–2)
BILIRUB SERPL-MCNC: 0.4 MG/DL (ref 0–1.2)
BUN BLDV-MCNC: 13 MG/DL (ref 6–20)
CALCIUM SERPL-MCNC: 9.5 MG/DL (ref 8.6–10.2)
CHLORIDE BLD-SCNC: 100 MMOL/L (ref 98–107)
CO2: 26 MMOL/L (ref 22–29)
CREAT SERPL-MCNC: 0.8 MG/DL (ref 0.5–1)
EOSINOPHILS ABSOLUTE: 0.2 E9/L (ref 0.05–0.5)
EOSINOPHILS RELATIVE PERCENT: 1.5 % (ref 0–6)
GFR AFRICAN AMERICAN: >60
GFR NON-AFRICAN AMERICAN: >60 ML/MIN/1.73
GLUCOSE BLD-MCNC: 99 MG/DL (ref 74–99)
HCT VFR BLD CALC: 43.9 % (ref 34–48)
HEMOGLOBIN: 14.3 G/DL (ref 11.5–15.5)
IMMATURE GRANULOCYTES #: 0.07 E9/L
IMMATURE GRANULOCYTES %: 0.5 % (ref 0–5)
LIPASE: 28 U/L (ref 13–60)
LYMPHOCYTES ABSOLUTE: 2.43 E9/L (ref 1.5–4)
LYMPHOCYTES RELATIVE PERCENT: 17.8 % (ref 20–42)
MAGNESIUM: 1.9 MG/DL (ref 1.6–2.6)
MCH RBC QN AUTO: 27.7 PG (ref 26–35)
MCHC RBC AUTO-ENTMCNC: 32.6 % (ref 32–34.5)
MCV RBC AUTO: 84.9 FL (ref 80–99.9)
MONOCYTES ABSOLUTE: 0.99 E9/L (ref 0.1–0.95)
MONOCYTES RELATIVE PERCENT: 7.3 % (ref 2–12)
NEUTROPHILS ABSOLUTE: 9.85 E9/L (ref 1.8–7.3)
NEUTROPHILS RELATIVE PERCENT: 72.2 % (ref 43–80)
PDW BLD-RTO: 13.3 FL (ref 11.5–15)
PLATELET # BLD: 309 E9/L (ref 130–450)
PMV BLD AUTO: 10.5 FL (ref 7–12)
POTASSIUM REFLEX MAGNESIUM: 3.2 MMOL/L (ref 3.5–5)
RBC # BLD: 5.17 E12/L (ref 3.5–5.5)
SODIUM BLD-SCNC: 138 MMOL/L (ref 132–146)
TOTAL PROTEIN: 7.5 G/DL (ref 6.4–8.3)
WBC # BLD: 13.6 E9/L (ref 4.5–11.5)

## 2020-03-13 PROCEDURE — 80053 COMPREHEN METABOLIC PANEL: CPT

## 2020-03-13 PROCEDURE — 85025 COMPLETE CBC W/AUTO DIFF WBC: CPT

## 2020-03-13 PROCEDURE — 6360000004 HC RX CONTRAST MEDICATION: Performed by: RADIOLOGY

## 2020-03-13 PROCEDURE — 72125 CT NECK SPINE W/O DYE: CPT

## 2020-03-13 PROCEDURE — 99284 EMERGENCY DEPT VISIT MOD MDM: CPT

## 2020-03-13 PROCEDURE — 74177 CT ABD & PELVIS W/CONTRAST: CPT

## 2020-03-13 PROCEDURE — 73564 X-RAY EXAM KNEE 4 OR MORE: CPT

## 2020-03-13 PROCEDURE — 83735 ASSAY OF MAGNESIUM: CPT

## 2020-03-13 PROCEDURE — 6370000000 HC RX 637 (ALT 250 FOR IP): Performed by: EMERGENCY MEDICINE

## 2020-03-13 PROCEDURE — 73700 CT LOWER EXTREMITY W/O DYE: CPT

## 2020-03-13 PROCEDURE — 83690 ASSAY OF LIPASE: CPT

## 2020-03-13 PROCEDURE — 70450 CT HEAD/BRAIN W/O DYE: CPT

## 2020-03-13 RX ORDER — POTASSIUM CHLORIDE 20 MEQ/1
40 TABLET, EXTENDED RELEASE ORAL ONCE
Status: COMPLETED | OUTPATIENT
Start: 2020-03-13 | End: 2020-03-13

## 2020-03-13 RX ORDER — ACETAMINOPHEN 325 MG/1
650 TABLET ORAL ONCE
Status: COMPLETED | OUTPATIENT
Start: 2020-03-13 | End: 2020-03-13

## 2020-03-13 RX ORDER — SODIUM CHLORIDE 0.9 % (FLUSH) 0.9 %
10 SYRINGE (ML) INJECTION ONCE
Status: DISCONTINUED | OUTPATIENT
Start: 2020-03-13 | End: 2020-03-14 | Stop reason: HOSPADM

## 2020-03-13 RX ADMIN — ACETAMINOPHEN 650 MG: 325 TABLET ORAL at 21:29

## 2020-03-13 RX ADMIN — IOPAMIDOL 110 ML: 755 INJECTION, SOLUTION INTRAVENOUS at 20:49

## 2020-03-13 RX ADMIN — POTASSIUM CHLORIDE 40 MEQ: 20 TABLET, EXTENDED RELEASE ORAL at 21:29

## 2020-03-13 ASSESSMENT — PAIN DESCRIPTION - DESCRIPTORS: DESCRIPTORS: ACHING

## 2020-03-13 ASSESSMENT — PAIN SCALES - GENERAL
PAINLEVEL_OUTOF10: 7
PAINLEVEL_OUTOF10: 7

## 2020-03-13 ASSESSMENT — PAIN DESCRIPTION - FREQUENCY: FREQUENCY: CONTINUOUS

## 2020-03-13 ASSESSMENT — PAIN DESCRIPTION - LOCATION: LOCATION: BACK;HEAD

## 2020-03-13 ASSESSMENT — PAIN DESCRIPTION - PAIN TYPE: TYPE: ACUTE PAIN

## 2020-03-13 NOTE — DISCHARGE INSTR - COC
STATUS:}    IV Access:  { [facility]ES[facility] IV ACCESS:583235079}    Nursing Mobility/ADLs:  Walking   {P DME QXES:077728471}  Transfer  {P DME CKGN:851163716}  Bathing  {CHP DME RFWB:132410234}  Dressing  {CHP DME TQYN:624575010}  Toileting  {CHP DME ZRXQ:877804567}  Feeding  {[facility] DME ENFU:903067034}  Med Admin  {P DME KPYW:503949148}  Med Delivery   { JOESPH MED Delivery:825701995}    Wound Care Documentation and Therapy:        Elimination:  Continence:   · Bowel: {YES / HY:59702}  · Bladder: {YES / YE:82943}  Urinary Catheter: {Urinary Catheter:465276976}   Colostomy/Ileostomy/Ileal Conduit: {YES / WR:82009}       Date of Last BM: ***  No intake or output data in the 24 hours ending 20 1811  No intake/output data recorded.     Safety Concerns:     [address] Safety Concerns:803875252}    Impairments/Disabilities:      [address] Impairments/Disabilities:815968817}    Nutrition Therapy:  Current Nutrition Therapy:   [address] Diet List:563465444}    Routes of Feeding: {[facility] DME Other Feedings:639872831}  Liquids: {Slp liquid thickness:13364}  Daily Fluid Restriction: {[facility] DME Yes amt example:802506007}  Last Modified Barium Swallow with Video (Video Swallowing Test): {Done Not Done YLSW:772787372}    Treatments at the Time of Hospital Discharge:   Respiratory Treatments: ***  Oxygen Therapy:  {Therapy; copd oxygen:95160}  Ventilator:    {[facility] Vent DNIM:276875499}    Rehab Therapies: {THERAPEUTIC INTERVENTION:9260209963}  Weight Bearing Status/Restrictions: [address] [employer] Weight Bearin}  Other Medical Equipment (for information only, NOT a DME order):  {EQUIPMENT:048951053}  Other Treatments: ***    Patient's personal belongings (please select all that are sent with patient):  {[facility] DME Belongings:343339833}    RN SIGNATURE:  {Esignature:451043324}    CASE MANAGEMENT/SOCIAL WORK SECTION    Inpatient Status Date: ***    Readmission Risk Assessment Score:  Readmission Risk              Risk of Unplanned Readmission:
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
## 2020-03-14 VITALS
BODY MASS INDEX: 51.49 KG/M2 | HEIGHT: 64 IN | HEART RATE: 82 BPM | OXYGEN SATURATION: 95 % | TEMPERATURE: 97.4 F | RESPIRATION RATE: 14 BRPM | SYSTOLIC BLOOD PRESSURE: 157 MMHG | DIASTOLIC BLOOD PRESSURE: 85 MMHG

## 2021-02-10 ENCOUNTER — HOSPITAL ENCOUNTER (INPATIENT)
Age: 51
LOS: 5 days | Discharge: HOME | DRG: 603 | End: 2021-02-15
Attending: EMERGENCY MEDICINE | Admitting: INTERNAL MEDICINE
Payer: COMMERCIAL

## 2021-02-10 ENCOUNTER — APPOINTMENT (OUTPATIENT)
Dept: GENERAL RADIOLOGY | Age: 51
DRG: 603 | End: 2021-02-10
Payer: COMMERCIAL

## 2021-02-10 DIAGNOSIS — A41.9 SEPSIS DUE TO CELLULITIS (HCC): Primary | ICD-10-CM

## 2021-02-10 DIAGNOSIS — L03.90 SEPSIS DUE TO CELLULITIS (HCC): Primary | ICD-10-CM

## 2021-02-10 PROBLEM — L03.119 CELLULITIS AND ABSCESS OF UPPER EXTREMITY: Status: ACTIVE | Noted: 2021-02-10

## 2021-02-10 PROBLEM — L02.419 CELLULITIS AND ABSCESS OF UPPER EXTREMITY: Status: ACTIVE | Noted: 2021-02-10

## 2021-02-10 LAB
ALBUMIN SERPL-MCNC: 4.3 G/DL (ref 3.5–5.2)
ALP BLD-CCNC: 80 U/L (ref 35–104)
ALT SERPL-CCNC: 10 U/L (ref 0–32)
ANION GAP SERPL CALCULATED.3IONS-SCNC: 12 MMOL/L (ref 7–16)
AST SERPL-CCNC: 16 U/L (ref 0–31)
BASOPHILS ABSOLUTE: 0.09 E9/L (ref 0–0.2)
BASOPHILS RELATIVE PERCENT: 0.5 % (ref 0–2)
BILIRUB SERPL-MCNC: 0.4 MG/DL (ref 0–1.2)
BUN BLDV-MCNC: 14 MG/DL (ref 6–20)
CALCIUM SERPL-MCNC: 9.1 MG/DL (ref 8.6–10.2)
CHLORIDE BLD-SCNC: 100 MMOL/L (ref 98–107)
CO2: 23 MMOL/L (ref 22–29)
CREAT SERPL-MCNC: 1 MG/DL (ref 0.5–1)
EOSINOPHILS ABSOLUTE: 0.14 E9/L (ref 0.05–0.5)
EOSINOPHILS RELATIVE PERCENT: 0.8 % (ref 0–6)
GFR AFRICAN AMERICAN: >60
GFR NON-AFRICAN AMERICAN: 58 ML/MIN/1.73
GLUCOSE BLD-MCNC: 101 MG/DL (ref 74–99)
HCT VFR BLD CALC: 41 % (ref 34–48)
HEMOGLOBIN: 14 G/DL (ref 11.5–15.5)
IMMATURE GRANULOCYTES #: 0.05 E9/L
IMMATURE GRANULOCYTES %: 0.3 % (ref 0–5)
LACTIC ACID: 1 MMOL/L (ref 0.5–2.2)
LYMPHOCYTES ABSOLUTE: 2.6 E9/L (ref 1.5–4)
LYMPHOCYTES RELATIVE PERCENT: 15.5 % (ref 20–42)
MCH RBC QN AUTO: 28.9 PG (ref 26–35)
MCHC RBC AUTO-ENTMCNC: 34.1 % (ref 32–34.5)
MCV RBC AUTO: 84.5 FL (ref 80–99.9)
MONOCYTES ABSOLUTE: 1.21 E9/L (ref 0.1–0.95)
MONOCYTES RELATIVE PERCENT: 7.2 % (ref 2–12)
NEUTROPHILS ABSOLUTE: 12.73 E9/L (ref 1.8–7.3)
NEUTROPHILS RELATIVE PERCENT: 75.7 % (ref 43–80)
PDW BLD-RTO: 13.3 FL (ref 11.5–15)
PLATELET # BLD: 341 E9/L (ref 130–450)
PMV BLD AUTO: 10.6 FL (ref 7–12)
POTASSIUM REFLEX MAGNESIUM: 3.7 MMOL/L (ref 3.5–5)
RBC # BLD: 4.85 E12/L (ref 3.5–5.5)
SEDIMENTATION RATE, ERYTHROCYTE: 20 MM/HR (ref 0–20)
SODIUM BLD-SCNC: 135 MMOL/L (ref 132–146)
TOTAL PROTEIN: 7.5 G/DL (ref 6.4–8.3)
WBC # BLD: 16.8 E9/L (ref 4.5–11.5)

## 2021-02-10 PROCEDURE — 6360000002 HC RX W HCPCS: Performed by: EMERGENCY MEDICINE

## 2021-02-10 PROCEDURE — 87070 CULTURE OTHR SPECIMN AEROBIC: CPT

## 2021-02-10 PROCEDURE — 85025 COMPLETE CBC W/AUTO DIFF WBC: CPT

## 2021-02-10 PROCEDURE — 96374 THER/PROPH/DIAG INJ IV PUSH: CPT

## 2021-02-10 PROCEDURE — 83605 ASSAY OF LACTIC ACID: CPT

## 2021-02-10 PROCEDURE — 87186 SC STD MICRODIL/AGAR DIL: CPT

## 2021-02-10 PROCEDURE — 80053 COMPREHEN METABOLIC PANEL: CPT

## 2021-02-10 PROCEDURE — 90715 TDAP VACCINE 7 YRS/> IM: CPT | Performed by: EMERGENCY MEDICINE

## 2021-02-10 PROCEDURE — 86140 C-REACTIVE PROTEIN: CPT

## 2021-02-10 PROCEDURE — 87075 CULTR BACTERIA EXCEPT BLOOD: CPT

## 2021-02-10 PROCEDURE — 87040 BLOOD CULTURE FOR BACTERIA: CPT

## 2021-02-10 PROCEDURE — 90471 IMMUNIZATION ADMIN: CPT | Performed by: EMERGENCY MEDICINE

## 2021-02-10 PROCEDURE — 2580000003 HC RX 258: Performed by: EMERGENCY MEDICINE

## 2021-02-10 PROCEDURE — 73130 X-RAY EXAM OF HAND: CPT

## 2021-02-10 PROCEDURE — 85651 RBC SED RATE NONAUTOMATED: CPT

## 2021-02-10 PROCEDURE — 1200000000 HC SEMI PRIVATE

## 2021-02-10 PROCEDURE — 99282 EMERGENCY DEPT VISIT SF MDM: CPT

## 2021-02-10 RX ORDER — ASPIRIN 81 MG/1
81 TABLET ORAL EVERY MORNING
Status: DISCONTINUED | OUTPATIENT
Start: 2021-02-11 | End: 2021-02-15 | Stop reason: HOSPADM

## 2021-02-10 RX ORDER — MAGNESIUM CHLORIDE 64 MG
64 TABLET, DELAYED RELEASE (ENTERIC COATED) ORAL EVERY MORNING
Status: DISCONTINUED | OUTPATIENT
Start: 2021-02-11 | End: 2021-02-15 | Stop reason: HOSPADM

## 2021-02-10 RX ORDER — ESCITALOPRAM OXALATE 10 MG/1
10 TABLET ORAL EVERY MORNING
Status: DISCONTINUED | OUTPATIENT
Start: 2021-02-11 | End: 2021-02-14

## 2021-02-10 RX ORDER — SODIUM CHLORIDE 0.9 % (FLUSH) 0.9 %
10 SYRINGE (ML) INJECTION PRN
Status: DISCONTINUED | OUTPATIENT
Start: 2021-02-10 | End: 2021-02-15 | Stop reason: HOSPADM

## 2021-02-10 RX ORDER — SODIUM CHLORIDE 0.9 % (FLUSH) 0.9 %
10 SYRINGE (ML) INJECTION EVERY 12 HOURS SCHEDULED
Status: DISCONTINUED | OUTPATIENT
Start: 2021-02-10 | End: 2021-02-15 | Stop reason: HOSPADM

## 2021-02-10 RX ORDER — SULFAMETHOXAZOLE AND TRIMETHOPRIM 800; 160 MG/1; MG/1
1 TABLET ORAL 2 TIMES DAILY
Status: ON HOLD | COMMUNITY
End: 2021-02-15 | Stop reason: HOSPADM

## 2021-02-10 RX ORDER — SODIUM CHLORIDE 9 MG/ML
INJECTION, SOLUTION INTRAVENOUS CONTINUOUS
Status: DISCONTINUED | OUTPATIENT
Start: 2021-02-10 | End: 2021-02-11

## 2021-02-10 RX ORDER — ONDANSETRON 2 MG/ML
4 INJECTION INTRAMUSCULAR; INTRAVENOUS EVERY 6 HOURS PRN
Status: DISCONTINUED | OUTPATIENT
Start: 2021-02-10 | End: 2021-02-15 | Stop reason: HOSPADM

## 2021-02-10 RX ORDER — ACETAMINOPHEN 500 MG
1000 TABLET ORAL EVERY 6 HOURS PRN
Status: DISCONTINUED | OUTPATIENT
Start: 2021-02-10 | End: 2021-02-15 | Stop reason: HOSPADM

## 2021-02-10 RX ORDER — PROMETHAZINE HYDROCHLORIDE 25 MG/1
12.5 TABLET ORAL EVERY 6 HOURS PRN
Status: DISCONTINUED | OUTPATIENT
Start: 2021-02-10 | End: 2021-02-15 | Stop reason: HOSPADM

## 2021-02-10 RX ORDER — OMEGA-3S/DHA/EPA/FISH OIL/D3 300MG-1000
800 CAPSULE ORAL EVERY MORNING
Status: DISCONTINUED | OUTPATIENT
Start: 2021-02-11 | End: 2021-02-15 | Stop reason: HOSPADM

## 2021-02-10 RX ORDER — MORPHINE SULFATE 4 MG/ML
4 INJECTION, SOLUTION INTRAMUSCULAR; INTRAVENOUS ONCE
Status: COMPLETED | OUTPATIENT
Start: 2021-02-10 | End: 2021-02-10

## 2021-02-10 RX ORDER — POLYETHYLENE GLYCOL 3350 17 G/17G
17 POWDER, FOR SOLUTION ORAL DAILY PRN
Status: DISCONTINUED | OUTPATIENT
Start: 2021-02-10 | End: 2021-02-15 | Stop reason: HOSPADM

## 2021-02-10 RX ORDER — HYDROCODONE BITARTRATE AND ACETAMINOPHEN 5; 325 MG/1; MG/1
1 TABLET ORAL EVERY 6 HOURS PRN
COMMUNITY
End: 2021-04-20

## 2021-02-10 RX ORDER — AMLODIPINE BESYLATE 5 MG/1
5 TABLET ORAL EVERY MORNING
Status: DISCONTINUED | OUTPATIENT
Start: 2021-02-11 | End: 2021-02-14

## 2021-02-10 RX ORDER — TOPIRAMATE 25 MG/1
25 TABLET ORAL
Status: DISCONTINUED | OUTPATIENT
Start: 2021-02-11 | End: 2021-02-15 | Stop reason: HOSPADM

## 2021-02-10 RX ADMIN — TETANUS TOXOID, REDUCED DIPHTHERIA TOXOID AND ACELLULAR PERTUSSIS VACCINE, ADSORBED 0.5 ML: 5; 2.5; 8; 8; 2.5 SUSPENSION INTRAMUSCULAR at 21:42

## 2021-02-10 RX ADMIN — VANCOMYCIN HYDROCHLORIDE 2000 MG: 10 INJECTION, POWDER, LYOPHILIZED, FOR SOLUTION INTRAVENOUS at 22:16

## 2021-02-10 RX ADMIN — MORPHINE SULFATE 4 MG: 4 INJECTION, SOLUTION INTRAMUSCULAR; INTRAVENOUS at 21:42

## 2021-02-10 RX ADMIN — Medication 2000 MG: at 22:15

## 2021-02-10 ASSESSMENT — PAIN DESCRIPTION - PROGRESSION: CLINICAL_PROGRESSION: GRADUALLY WORSENING

## 2021-02-10 ASSESSMENT — PAIN DESCRIPTION - LOCATION: LOCATION: FINGER (COMMENT WHICH ONE)

## 2021-02-10 ASSESSMENT — ENCOUNTER SYMPTOMS
COUGH: 0
BLOOD IN STOOL: 0
BACK PAIN: 0
ABDOMINAL PAIN: 0
NAUSEA: 0
SHORTNESS OF BREATH: 0
VOMITING: 0

## 2021-02-10 ASSESSMENT — PAIN DESCRIPTION - ORIENTATION: ORIENTATION: RIGHT;MID

## 2021-02-10 ASSESSMENT — PAIN SCALES - GENERAL
PAINLEVEL_OUTOF10: 6
PAINLEVEL_OUTOF10: 10

## 2021-02-11 LAB
ANION GAP SERPL CALCULATED.3IONS-SCNC: 10 MMOL/L (ref 7–16)
BASOPHILS ABSOLUTE: 0.06 E9/L (ref 0–0.2)
BASOPHILS RELATIVE PERCENT: 0.4 % (ref 0–2)
BUN BLDV-MCNC: 12 MG/DL (ref 6–20)
C-REACTIVE PROTEIN: 0.7 MG/DL (ref 0–0.4)
CALCIUM SERPL-MCNC: 9 MG/DL (ref 8.6–10.2)
CHLORIDE BLD-SCNC: 100 MMOL/L (ref 98–107)
CO2: 24 MMOL/L (ref 22–29)
CREAT SERPL-MCNC: 0.9 MG/DL (ref 0.5–1)
EOSINOPHILS ABSOLUTE: 0.12 E9/L (ref 0.05–0.5)
EOSINOPHILS RELATIVE PERCENT: 0.8 % (ref 0–6)
GFR AFRICAN AMERICAN: >60
GFR NON-AFRICAN AMERICAN: >60 ML/MIN/1.73
GLUCOSE BLD-MCNC: 115 MG/DL (ref 74–99)
HCT VFR BLD CALC: 40.8 % (ref 34–48)
HEMOGLOBIN: 13 G/DL (ref 11.5–15.5)
IMMATURE GRANULOCYTES #: 0.06 E9/L
IMMATURE GRANULOCYTES %: 0.4 % (ref 0–5)
LYMPHOCYTES ABSOLUTE: 1.91 E9/L (ref 1.5–4)
LYMPHOCYTES RELATIVE PERCENT: 13 % (ref 20–42)
MCH RBC QN AUTO: 27.5 PG (ref 26–35)
MCHC RBC AUTO-ENTMCNC: 31.9 % (ref 32–34.5)
MCV RBC AUTO: 86.4 FL (ref 80–99.9)
MONOCYTES ABSOLUTE: 1.51 E9/L (ref 0.1–0.95)
MONOCYTES RELATIVE PERCENT: 10.3 % (ref 2–12)
NEUTROPHILS ABSOLUTE: 11.06 E9/L (ref 1.8–7.3)
NEUTROPHILS RELATIVE PERCENT: 75.1 % (ref 43–80)
PDW BLD-RTO: 13.3 FL (ref 11.5–15)
PLATELET # BLD: 323 E9/L (ref 130–450)
PMV BLD AUTO: 10.6 FL (ref 7–12)
POTASSIUM REFLEX MAGNESIUM: 3.6 MMOL/L (ref 3.5–5)
RBC # BLD: 4.72 E12/L (ref 3.5–5.5)
SODIUM BLD-SCNC: 134 MMOL/L (ref 132–146)
WBC # BLD: 14.7 E9/L (ref 4.5–11.5)

## 2021-02-11 PROCEDURE — 6370000000 HC RX 637 (ALT 250 FOR IP): Performed by: INTERNAL MEDICINE

## 2021-02-11 PROCEDURE — 6360000002 HC RX W HCPCS: Performed by: INTERNAL MEDICINE

## 2021-02-11 PROCEDURE — 1200000000 HC SEMI PRIVATE

## 2021-02-11 PROCEDURE — 2580000003 HC RX 258: Performed by: INTERNAL MEDICINE

## 2021-02-11 PROCEDURE — 80048 BASIC METABOLIC PNL TOTAL CA: CPT

## 2021-02-11 PROCEDURE — 85025 COMPLETE CBC W/AUTO DIFF WBC: CPT

## 2021-02-11 PROCEDURE — 36415 COLL VENOUS BLD VENIPUNCTURE: CPT

## 2021-02-11 RX ORDER — DOXYCYCLINE HYCLATE 100 MG/1
100 CAPSULE ORAL EVERY 12 HOURS SCHEDULED
Status: DISCONTINUED | OUTPATIENT
Start: 2021-02-11 | End: 2021-02-12

## 2021-02-11 RX ORDER — HYDROCODONE BITARTRATE AND ACETAMINOPHEN 5; 325 MG/1; MG/1
1 TABLET ORAL EVERY 4 HOURS PRN
Status: DISCONTINUED | OUTPATIENT
Start: 2021-02-11 | End: 2021-02-15 | Stop reason: HOSPADM

## 2021-02-11 RX ADMIN — TOPIRAMATE 25 MG: 25 TABLET, FILM COATED ORAL at 06:30

## 2021-02-11 RX ADMIN — ACETAMINOPHEN 1000 MG: 500 TABLET ORAL at 09:18

## 2021-02-11 RX ADMIN — HYDROCODONE BITARTRATE AND ACETAMINOPHEN 1 TABLET: 5; 325 TABLET ORAL at 17:32

## 2021-02-11 RX ADMIN — ASPIRIN 81 MG: 81 TABLET, COATED ORAL at 08:57

## 2021-02-11 RX ADMIN — Medication 2000 MG: at 18:18

## 2021-02-11 RX ADMIN — ACETAMINOPHEN 1000 MG: 500 TABLET ORAL at 00:17

## 2021-02-11 RX ADMIN — DOXYCYCLINE HYCLATE 100 MG: 100 CAPSULE ORAL at 10:36

## 2021-02-11 RX ADMIN — HYDROCODONE BITARTRATE AND ACETAMINOPHEN 1 TABLET: 5; 325 TABLET ORAL at 11:25

## 2021-02-11 RX ADMIN — Medication 10 ML: at 22:05

## 2021-02-11 RX ADMIN — Medication 2000 MG: at 10:36

## 2021-02-11 RX ADMIN — HYDROCODONE BITARTRATE AND ACETAMINOPHEN 1 TABLET: 5; 325 TABLET ORAL at 23:17

## 2021-02-11 RX ADMIN — CHOLECALCIFEROL TAB 10 MCG (400 UNIT) 800 UNITS: 10 TAB at 08:57

## 2021-02-11 RX ADMIN — SODIUM CHLORIDE: 9 INJECTION, SOLUTION INTRAVENOUS at 10:36

## 2021-02-11 RX ADMIN — DOXYCYCLINE HYCLATE 100 MG: 100 CAPSULE ORAL at 20:23

## 2021-02-11 RX ADMIN — SODIUM CHLORIDE: 9 INJECTION, SOLUTION INTRAVENOUS at 00:17

## 2021-02-11 RX ADMIN — Medication 10 ML: at 00:19

## 2021-02-11 RX ADMIN — MAGNESIUM 64 MG (MAGNESIUM CHLORIDE) TABLET,DELAYED RELEASE 64 MG: at 08:56

## 2021-02-11 RX ADMIN — ONDANSETRON 4 MG: 2 INJECTION INTRAMUSCULAR; INTRAVENOUS at 01:57

## 2021-02-11 RX ADMIN — ONDANSETRON 4 MG: 2 INJECTION INTRAMUSCULAR; INTRAVENOUS at 22:33

## 2021-02-11 RX ADMIN — ENOXAPARIN SODIUM 40 MG: 40 INJECTION SUBCUTANEOUS at 08:58

## 2021-02-11 ASSESSMENT — PAIN DESCRIPTION - ORIENTATION
ORIENTATION: RIGHT
ORIENTATION: RIGHT

## 2021-02-11 ASSESSMENT — PAIN DESCRIPTION - PROGRESSION
CLINICAL_PROGRESSION: NOT CHANGED
CLINICAL_PROGRESSION: GRADUALLY WORSENING
CLINICAL_PROGRESSION: NOT CHANGED

## 2021-02-11 ASSESSMENT — PAIN - FUNCTIONAL ASSESSMENT
PAIN_FUNCTIONAL_ASSESSMENT: PREVENTS OR INTERFERES SOME ACTIVE ACTIVITIES AND ADLS

## 2021-02-11 ASSESSMENT — PAIN SCALES - GENERAL
PAINLEVEL_OUTOF10: 2
PAINLEVEL_OUTOF10: 1
PAINLEVEL_OUTOF10: 7
PAINLEVEL_OUTOF10: 4

## 2021-02-11 ASSESSMENT — PAIN DESCRIPTION - DESCRIPTORS
DESCRIPTORS: SORE
DESCRIPTORS: ACHING;DISCOMFORT;HEADACHE
DESCRIPTORS: ACHING;DISCOMFORT;SORE;POUNDING

## 2021-02-11 ASSESSMENT — PAIN DESCRIPTION - ONSET
ONSET: ON-GOING

## 2021-02-11 ASSESSMENT — PAIN DESCRIPTION - LOCATION
LOCATION: HAND
LOCATION: HAND;HEAD
LOCATION: FINGER (COMMENT WHICH ONE);HAND
LOCATION: HAND

## 2021-02-11 ASSESSMENT — PAIN DESCRIPTION - PAIN TYPE
TYPE: ACUTE PAIN
TYPE: ACUTE PAIN

## 2021-02-11 ASSESSMENT — PAIN DESCRIPTION - FREQUENCY: FREQUENCY: CONTINUOUS

## 2021-02-11 NOTE — ED NOTES
FIRST PROVIDER CONTACT ASSESSMENT NOTE      Department of Emergency Medicine   ED  First Provider Note   2/10/21  7:13 PM EST    Chief Complaint: Wound Check (right hand, started on friday a small white pimple, had someone jennifer it yesterday, now has swelling, chills and fever) and Fever      History of Present Illness:    Judy Causey is a 44 y.o. female who presents to the ED by private car for  Hand infection   Focused Screening Exam:  Constitutional:  Alert, appears stated age and is in no distress.   Heart rrr   Lungs clear     *ALLERGIES*     Bee venom and Sudafed [pseudoephedrine hcl]     ED Triage Vitals   BP Temp Temp src Pulse Resp SpO2 Height Weight   -- -- -- -- -- -- -- --        Initial Plan of Care:  Initiate Treatment-Testing, Proceed toTreatment Area When Bed Available for ED Attending/MLP to Continue Care    -----------------END OF FIRST PROVIDER CONTACT ASSESSMENT NOTE--------------  Electronically signed by LILIANA Prado   DD: 2/10/21     LILIANA Prado  02/10/21 2052

## 2021-02-11 NOTE — CONSULTS
[address] [place] [address] [facility]    Consultation Note     Admit Date: 2/10/2021  8:35 PM    Reason for Consult:   Cellulitis with abscess right middle finger    Attending Physician:  Amna Ordonez MD     Chief Complaint: Swelling right middle finger    HISTORY OF PRESENT ILLNESS:     The patient is a 44 y.o.  female not previously known to the Infectious Diseases service. The patient is an employee of a local orthopedic surgeon. Last week she developed a pimple on her right middle finger. She squeezed the area to attempt to express pus with no results. The area became increasingly inflamed. She reported to work Monday and was evaluated by the physician and placed on oral Bactrim. The following day the finger was still swollen and there was a punctate area at the center suspicious for a bite. Incision and drainage was performed and the wound was cultured. The following day she returned to the office and the area was more swollen and more tender and there were 2 punctate areas again felt to be associated with a possible bite. The patient developed fever chills headache and nausea and came to the hospital for evaluation. She was placed on intravenous antibiotics. The entire hand was swollen and the swelling of the hand has improved somewhat however she continues to have significant swelling of her right middle finger with tenderness to palpation continues to complain of nausea fever and chills. She is not diabetic. She has not been on any immunosuppressive therapy. She was noted to have elevated WBC count at 16.8. Plain films were obtained revealed diffuse soft tissue swelling.     Past Medical History:          Diagnosis Date    Arthritis     Headache     LEFT SIDE LAST 2 DAYS    Hypertension     Left-sided headache     Left-sided weakness     ESPECIALLY FINE MOTOR/ARM    Numbness     UPPER LEG & ARM ON LEFT SIDE    TIA (transient ischemic attack) 04/06/2018    Trouble swallowing     THICK OR DRY FOODS     Past Surgical History:          Procedure Laterality Date    ADRENALECTOMY      BREAST SURGERY      ENDOMETRIAL ABLATION      TONSILLECTOMY AND ADENOIDECTOMY       Current Medications:     Scheduled Meds:   amLODIPine  5 mg Oral QAM    aspirin  81 mg Oral QAM    escitalopram  10 mg Oral QAM    magnesium chloride  64 mg Oral QAM    topiramate  25 mg Oral QAM AC    vitamin D3  800 Units Oral QAM    sodium chloride flush  10 mL Intravenous 2 times per day    enoxaparin  40 mg Subcutaneous Daily     Continuous Infusions:   sodium chloride 100 mL/hr at 02/11/21 0017     PRN Meds:acetaminophen, sodium chloride flush, promethazine **OR** ondansetron, polyethylene glycol    Allergies:  Bee venom and Sudafed [pseudoephedrine hcl]    Social History:     Social History     Socioeconomic History    Marital status:      Spouse name: Suzette Lazcano Number of children: None    Years of education: None    Highest education level: None   Occupational History    Occupation:      Employer: [employer]   Social Needs    Financial resource strain: None    Food insecurity     Worry: None     Inability: None    Transportation needs     Medical: None     Non-medical: None   Tobacco Use    Smoking status: Never Smoker    Smokeless tobacco: Never Used   Substance and Sexual Activity    Alcohol use: No    Drug use: No    Sexual activity: Never     Partners: Male   Lifestyle    Physical activity     Days per week: None     Minutes per session: None    Stress: None   Relationships    Social connections     Talks on phone: None     Gets together: None     Attends Hindu service: None     Active member of club or organization: None     Attends meetings of clubs or organizations: None     Relationship status: None    Intimate partner violence     Fear of current or ex partner: None     Emotionally abused: None     Physically abused: None     Forced sexual activity: None   Other Topics Concern    None   Social History Narrative    None         Family History:         Problem Relation Age of Onset    Diabetes Mother     Arthritis Mother     Heart Disease Father     Thyroid Disease Father     Allergy (Severe) Father     Bleeding Prob Father      REVIEW OF SYSTEMS:      Constitutional: positive for chills, fatigue and fevers, negative for weight loss  Eyes: negative for icterus and redness  Ears, nose, mouth, throat, and face: negative for epistaxis, hearing loss, nasal congestion, sore mouth, sore throat and tinnitus  Respiratory: negative for cough and hemoptysis  Cardiovascular: negative for chest pain and palpitations  Gastrointestinal: negative for abdominal pain and vomiting  Genitourinary:negative for dysuria and frequency  Integument/breast: negative for pruritus and rash  Hematologic/lymphatic: negative for bleeding and easy bruising  Musculoskeletal:positive for Right hand swelling-see HPI, negative for back pain and muscle weakness  Neurological: negative for memory problems and paresthesia  Behavioral/Psych: negative for anxiety and depression  Endocrine: negative for temperature intolerance  Allergic/Immunologic: negative for anaphylaxis and angioedema    PHYSICAL EXAM:      Vitals:    BP (!) 106/57   Pulse 72   Temp 98.5 °F (36.9 °C) (Oral)   Resp 16   Ht 5' 8\" (1.727 m)   Wt 290 lb 8 oz (131.8 kg)   LMP 02/13/2018   SpO2 97%   BMI 44.17 kg/m²   Constitutional: The patient is awake, alert, and oriented. Skin: Warm and dry. No rashes were noted. No jaundice. HEENT: Eyes show round, and reactive pupils. Moist mucous membranes, no ulcerations, no thrush. Neck: Supple to movements. No lymphadenopathy. Chest: No use of accessory muscles to breathe. Symmetrical expansion. Auscultation reveals no wheezing, crackles, or rhonchi. Cardiovascular: Regular rate and rhythm. No murmurs appreciated. Abdomen: Positive bowel sounds to auscultation.  Benign to palpation. No masses felt. No hepatosplenomegaly. Extremities: No clubbing, no cyanosis, no edema. Neurological: No focal neurologic deficits                CBC+dif:  Recent Labs     02/10/21  1952 02/11/21  0402   WBC 16.8* 14.7*   HGB 14.0 13.0   HCT 41.0 40.8   MCV 84.5 86.4    323   NEUTROABS 12.73* 11.06*     Lab Results   Component Value Date    CRP 0.7 (H) 02/10/2021     Lab Results   Component Value Date    CRPHS 6.2 (H) 01/11/2019     Lab Results   Component Value Date    SEDRATE 20 02/10/2021    SEDRATE 25 (H) 04/07/2018     Lab Results   Component Value Date    ALT 10 02/10/2021    AST 16 02/10/2021    ALKPHOS 80 02/10/2021    BILITOT 0.4 02/10/2021     Lab Results   Component Value Date     02/11/2021    K 3.6 02/11/2021     02/11/2021    CO2 24 02/11/2021    BUN 12 02/11/2021    CREATININE 0.9 02/11/2021    GFRAA >60 02/11/2021    LABGLOM >60 02/11/2021    GLUCOSE 115 02/11/2021    PROT 7.5 02/10/2021    LABALBU 4.3 02/10/2021    CALCIUM 9.0 02/11/2021    BILITOT 0.4 02/10/2021    ALKPHOS 80 02/10/2021    AST 16 02/10/2021    ALT 10 02/10/2021       Lab Results   Component Value Date    PROTIME 12.4 05/21/2019    INR 1.1 05/21/2019       Radiology:    XR HAND RIGHT (MIN 3 VIEWS)   Final Result   No acute osseous abnormality. Diffuse soft tissue swelling. Please consider MR or triple phase bone scan and if there is clinical   suspicion for osteomyelitis. Microbiology:    Wound culture obtained from outside lab growing moderate GPC's    Gram stain with few WBCs and no organisms    Blood and wound cultures here in process      Problem list:    Active Problems:    Cellulitis and abscess of upper extremity  Resolved Problems:    * No resolved hospital problems.  *      Assessment:    · Cellulitis with abscess right middle finger status post incision and drainage  · Wound culture positive for staph aureus ([employer] micro) M_SA  · Systemic inflammatory response syndrome    Plan:      · Continue Ancef 2 g IV every 8 hours  · Add doxycycline for MRSA coverage pending sensitivities   · Check cultures  · Baseline ESR, CRP noted  · Check procalcitonin  · Discussed with Dr. Ju Arnold  · Monitor labs  · Will follow with you    Thank you for having us see this patient in consultation. I will be discussing this case with the treating physicians.       Electronically signed by Cosme Campo DO on 2/11/2021 at 9:55 AM

## 2021-02-11 NOTE — H&P
History & Physical        Reason for admission:   Cellulitis/abscess R hand middle digit and hand    History Obtained From:  ER, Dr. Stanley Meyers, patient    HISTORY OF PRESENT ILLNESS:    The patient is a 44 y.o. female who presents with infected middle finger. Sx.started Monday. Started on Bactrim DS. 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 Tuesday, needle incision and Wednesday, more thorough I an D. Yesterday, increase in pain, swelling which extended into the hand. Presented to ER where she was noted to be slightly tachycardic with elevated WBC/left shift. Given Kiesha Ledger in ER and admitted. Given IVF, pancultured. She does note a bit of headache which she relates to morphine given in ER  ID and Dr. Stanley Meyers in consult. Med history includes: Hx Vertebrobasilar TIA, Hypertension, Hyperlipidemia, Migraines, Vit D def, KARL on CPAP,Prediabetes, Multinodular goiter, Anxiety/depression. Past Medical History:        Diagnosis Date    Arthritis     Headache     LEFT SIDE LAST 2 DAYS    Hypertension     Left-sided headache     Left-sided weakness     ESPECIALLY FINE MOTOR/ARM    Numbness     UPPER LEG & ARM ON LEFT SIDE    TIA (transient ischemic attack) 04/06/2018    Trouble swallowing     THICK OR DRY FOODS       Past Surgical History:        Procedure Laterality Date    ADRENALECTOMY      BREAST SURGERY      ENDOMETRIAL ABLATION      TONSILLECTOMY AND ADENOIDECTOMY         Medications Prior to Admission:    Medications Prior to Admission: HYDROcodone-acetaminophen ([address]) 5-325 MG per tablet, Take 1 tablet by mouth every 6 hours as needed for Pain.  Last dose 2/10/21 [email or web address]  sulfamethoxazole-trimethoprim (BACTRIM DS;SEPTRA DS) 800-160 MG per tablet, Take 1 tablet by mouth 2 times daily  topiramate (TOPAMAX) 25 MG tablet, Take 25 mg by mouth every morning (before breakfast)  acetaminophen (TYLENOL) 500 MG tablet, Take 1,000 mg by mouth every 6 hours as needed for Pain  hydrochlorothiazide (HYDRODIURIL) 12.5 MG tablet, Take 12.5 mg by mouth every morning   aspirin 81 MG EC tablet, Take 1 tablet by mouth 2 times daily (Patient taking differently: Take 81 mg by mouth every morning )  vitamin D3 (CHOLECALCIFEROL) 400 units TABS tablet, Take 800 Units by mouth every morning   Magnesium 100 MG CAPS, Take 1 capsule by mouth every morning     Allergies:  Bee venom and Sudafed [pseudoephedrine hcl]    Social History:   TOBACCO:   reports that she has never smoked. She has never used smokeless tobacco.  ETOH:   reports no history of alcohol use. Family History:       Problem Relation Age of Onset    Diabetes Mother     Arthritis Mother     Heart Disease Father     Thyroid Disease Father     Allergy (Severe) Father     Bleeding Prob Father        REVIEW OF SYSTEMS:  CONSTITUTIONAL:  Neg   Recent weight changes,fatigue,fever,chills or night sweats  EYES:  Neg  blurriness,tearing,itching or acute change in vision  NOSE:  Neg  rhinorrhea,sneezing,itching,allergy or epistaxis  MOUTH/THROAT:  Neg  bleeding gums,hoarseness or sore throat. RESPIRATORY:   Neg SOB,wheeze,cough,sputum,hemoptysis or bronochitis  CARDIOVASCULAR   Neg : chest pain,palpitations,dyspnea on exertion,orthopnea,paroxysmal nocturnal dyspnea or edema  GASTROINTESTINAL:  Neg   Appetite changes,nausea,vomiting,or diarrhea,indigestion,dysphagia,change in bowel movements, or abdominal pain. GENITOURINARY:  Neg  Urinary frequency,hesitancy,urgency,polyuria,dysuria,hematuria,or incontinence. HEMATOLOGIC/LYMPHATIC:  Neg  Anemia,bleeding tendency  MUSCULOSKELETAL:  Neg   New myalgias,bone pain,joint pain,stiffness and has had no change in gait. Pos: swelling R hand. NEUROLOGICAL:  Neg  Loss of Consciousness,memeory loss,forgetfulness,periods of confusion,decline in intellect,nervousness,insomina,aphasia or dysarthria. SKIN :  Neg  skin or hair changes,and has no itching,rashes.  Pos:wound    PHYSICAL EXAM:  BP 127/75   Pulse 95   Temp 98.6 °F (37 °C) (Oral)   Resp 18   Ht 5' 8\" (1.727 m)   Wt 290 lb 8 oz (131.8 kg)   LMP 02/13/2018   SpO2 96%   BMI 44.17 kg/m²   General appearance: alert, appears stated age, cooperative and no distress  Head: Normocephalic, without obvious abnormality, atraumatic  Eyes: conjunctivae/corneas clear. PERRL, EOM's intact. Ears: normal external ear canals both ears  Neck: no adenopathy, no carotid bruit, no JVD, supple, symmetrical, trachea midline. Prominent thyroid, no tenderness/mass/nodules  Lungs: Clear to A and P  Heart: regular rate and rhythm, S1, S2 normal, no murmur, regular rate and rhythm ,no precordial heave  Abdomen:soft, non-tender; non-distended normal bowel sounds no masses, no organomegaly  Extremities:-CCE,Homans sign is negative, no sign of DVT. R 3rd digit with open wound. No current drainage. Edema extends to dorsal aspect of hand. Wrist, Forearm appear normal. She can flex, extend digits though restricted by edema  Skin: Skin color, texture, turgor normal. No rashes. Neurologic:Mental status: Alert, oriented, thought content appropriate  Cranial nerves:II-XII Grossly intact  Sensory: normal  Motor:grossly normal    DATA:  Recent Labs     02/10/21  1952 02/11/21  0402   WBC 16.8* 14.7*   HGB 14.0 13.0   HCT 41.0 40.8   MCV 84.5 86.4    323     Recent Labs     02/10/21  1952 02/11/21  0402    134   K 3.7 3.6    100   CO2 23 24   BUN 14 12   CREATININE 1.0 0.9   GLUCOSE 101* 115*     Recent Labs     02/10/21  1952   AST 16   ALT 10   BILITOT 0.4   ALKPHOS 80     No results for input(s): CKTOTAL, CKMB, TROPONINI in the last 72 hours.   Lab Results   Component Value Date    INR 1.1 05/21/2019    INR 1.1 01/11/2019    INR 1.2 04/07/2018    PROTIME 12.4 05/21/2019    PROTIME 11.9 01/11/2019    PROTIME 12.9 (H) 04/07/2018        CBC with Differential:    Lab Results   Component Value Date    WBC 14.7 02/11/2021    RBC 4.72 02/11/2021    HGB 13.0 02/11/2021    HCT 40.8 02/11/2021     02/11/2021    MCV 86.4 02/11/2021    MCH 27.5 02/11/2021    MCHC 31.9 02/11/2021    RDW 13.3 02/11/2021    LYMPHOPCT 13.0 02/11/2021    MONOPCT 10.3 02/11/2021    BASOPCT 0.4 02/11/2021    MONOSABS 1.51 02/11/2021    LYMPHSABS 1.91 02/11/2021    EOSABS 0.12 02/11/2021    BASOSABS 0.06 02/11/2021     CMP:    Lab Results   Component Value Date     02/11/2021    K 3.6 02/11/2021     02/11/2021    CO2 24 02/11/2021    BUN 12 02/11/2021    CREATININE 0.9 02/11/2021    GFRAA >60 02/11/2021    LABGLOM >60 02/11/2021    GLUCOSE 115 02/11/2021    PROT 7.5 02/10/2021    LABALBU 4.3 02/10/2021    CALCIUM 9.0 02/11/2021    BILITOT 0.4 02/10/2021    ALKPHOS 80 02/10/2021    AST 16 02/10/2021    ALT 10 02/10/2021     Magnesium:    Lab Results   Component Value Date    MG 1.9 03/13/2020     Phosphorus:  No results found for: PHOS  Troponin:    Lab Results   Component Value Date    TROPONINI <0.01 05/21/2019     U/A:  No results found for: NITRITE, COLORU, PHUR, LABCAST, WBCUA, RBCUA, MUCUS, TRICHOMONAS, YEAST, BACTERIA, CLARITYU, SPECGRAV, LEUKOCYTESUR, UROBILINOGEN, BILIRUBINUR, BLOODU, GLUCOSEU, AMORPHOUS  ABG:  No results found for: PHART, VSL1BKA, PO2ART, BON8NAK, BEART, THGBART, FPZ1WJJ, K4EYGMAG       RADIOLOGY:   XR HAND RIGHT (MIN 3 VIEWS)   Final Result   No acute osseous abnormality. Diffuse soft tissue swelling. Please consider MR or triple phase bone scan and if there is clinical   suspicion for osteomyelitis. ASSESSMENT     -Cellulitis/abscess R middle finger and hand  -Hypertension  -Hx migraine headache  -Hx vertebrobasilar TIA  -KARL on CPAP  -Prediabetes.         PLAN:  IVF likely will d/c later today. Case discussed with Dr. Marylu Martinez at bedside. No plan for additional I and D at this time. Appropriate premorbid meds resumed. Can use CPAP from home. ID consulted for IV/oral atb recommendations. Follow labs/culture results.   Home once stable/on oral atb.     Electronically signed by Angy Nieto MD on 2/11/2021 at 7:26 AM

## 2021-02-11 NOTE — ED PROVIDER NOTES
68-year-old female presents to the emergency department with a wound on the lateral aspect of her right middle finger. She is a right-hand-dominant female who noticed approximately 3 days ago that she had a small pimple. 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 She came in for further evaluation. She states no other symptoms like cough shortness of breath nausea vomiting diarrhea or urinary symptoms. The history is provided by the patient. Hand Problem  Location:  Hand  Hand location:  R hand  Injury: no    Pain details:     Quality:  Aching    Radiates to:  Does not radiate    Severity:  Moderate    Onset quality:  Gradual    Duration:  5 days    Timing:  Intermittent    Progression:  Waxing and waning  Handedness:  Right-handed  Tetanus status:  Unknown  Prior injury to area:  No  Relieved by:  Nothing  Worsened by:  Nothing  Ineffective treatments: Lancing, Bactrim. Associated symptoms: fever, stiffness and swelling    Associated symptoms: no back pain         Review of Systems   Constitutional: Positive for chills and fever. Respiratory: Negative for cough and shortness of breath. Cardiovascular: Negative for chest pain. Gastrointestinal: Negative for abdominal pain, blood in stool, nausea and vomiting. Genitourinary: Negative for dysuria and frequency. Musculoskeletal: Positive for stiffness. Negative for back pain. Skin: Positive for wound. Negative for rash. Neurological: Negative for weakness and headaches. All other systems reviewed and are negative. Physical Exam  Constitutional:       Appearance: Normal appearance. She is obese. HENT:      Head: Normocephalic and atraumatic. Mouth/Throat:      Mouth: Mucous membranes are moist.   Eyes:      Extraocular Movements: Extraocular movements intact.       Pupils: Pupils are equal, round, and reactive to light. Neck:      Musculoskeletal: Normal range of motion and neck supple. Cardiovascular:      Rate and Rhythm: Normal rate and regular rhythm. Pulses: Normal pulses. Heart sounds: Normal heart sounds. Pulmonary:      Effort: Pulmonary effort is normal.      Breath sounds: Normal breath sounds. Abdominal:      General: Abdomen is flat. Bowel sounds are normal. There is no distension. Palpations: Abdomen is soft. Tenderness: There is no abdominal tenderness. Musculoskeletal:        Hands:    Skin:     Findings: Abscess present. Neurological:      Mental Status: She is alert and oriented to person, place, and time. Procedures     MDM  Number of Diagnoses or Management Options  Sepsis due to cellulitis [facility])  Diagnosis management comments: Patient seen and examined. Concern for cellulitis of the finger though not feel patient has flexor tenosynovitis. Patient is already been on antibiotics and had an I&D and patient symptoms seem to be getting worse concerning me for possible need for IV antibiotics. She is found of a leukocytosis along with initial tachycardia. After work-up patient was felt to need admission after blood cultures were drawn and antibiotics were initiated and case was discussed with Dr. Ewelina Garcia who will admit.           --------------------------------------------- PAST HISTORY ---------------------------------------------  Past Medical History:  has a past medical history of Arthritis, Headache, Hypertension, Left-sided headache, Left-sided weakness, Numbness, TIA (transient ischemic attack), and Trouble swallowing. Past Surgical History:  has a past surgical history that includes Breast surgery; Tonsillectomy and adenoidectomy; Endometrial ablation; and Adrenalectomy. Social History:  reports that she has never smoked. She has never used smokeless tobacco. She reports that she does not drink alcohol or use drugs.     Family History: family history includes Allergy (Severe) in her father; Arthritis in her mother; Bleeding Prob in her father; Diabetes in her mother; Heart Disease in her father; Thyroid Disease in her father. The patients home medications have been reviewed.     Allergies: Bee venom and Sudafed [pseudoephedrine hcl]    -------------------------------------------------- RESULTS -------------------------------------------------    LABS:  Results for orders placed or performed during the hospital encounter of 02/10/21   CBC Auto Differential   Result Value Ref Range    WBC 16.8 (H) 4.5 - 11.5 E9/L    RBC 4.85 3.50 - 5.50 E12/L    Hemoglobin 14.0 11.5 - 15.5 g/dL    Hematocrit 41.0 34.0 - 48.0 %    MCV 84.5 80.0 - 99.9 fL    MCH 28.9 26.0 - 35.0 pg    MCHC 34.1 32.0 - 34.5 %    RDW 13.3 11.5 - 15.0 fL    Platelets 947 116 - 432 E9/L    MPV 10.6 7.0 - 12.0 fL    Neutrophils % 75.7 43.0 - 80.0 %    Immature Granulocytes % 0.3 0.0 - 5.0 %    Lymphocytes % 15.5 (L) 20.0 - 42.0 %    Monocytes % 7.2 2.0 - 12.0 %    Eosinophils % 0.8 0.0 - 6.0 %    Basophils % 0.5 0.0 - 2.0 %    Neutrophils Absolute 12.73 (H) 1.80 - 7.30 E9/L    Immature Granulocytes # 0.05 E9/L    Lymphocytes Absolute 2.60 1.50 - 4.00 E9/L    Monocytes Absolute 1.21 (H) 0.10 - 0.95 E9/L    Eosinophils Absolute 0.14 0.05 - 0.50 E9/L    Basophils Absolute 0.09 0.00 - 0.20 E9/L   Comprehensive Metabolic Panel w/ Reflex to MG   Result Value Ref Range    Sodium 135 132 - 146 mmol/L    Potassium reflex Magnesium 3.7 3.5 - 5.0 mmol/L    Chloride 100 98 - 107 mmol/L    CO2 23 22 - 29 mmol/L    Anion Gap 12 7 - 16 mmol/L    Glucose 101 (H) 74 - 99 mg/dL    BUN 14 6 - 20 mg/dL    CREATININE 1.0 0.5 - 1.0 mg/dL    GFR Non-African American 58 >=60 mL/min/1.73    GFR African American >60     Calcium 9.1 8.6 - 10.2 mg/dL    Total Protein 7.5 6.4 - 8.3 g/dL    Albumin 4.3 3.5 - 5.2 g/dL    Total Bilirubin 0.4 0.0 - 1.2 mg/dL    Alkaline Phosphatase 80 35 - 104 U/L    ALT 10 0 - 32 U/L    AST 16 0 - 31 U/L   Lactic Acid, Plasma   Result Value Ref Range    Lactic Acid 1.0 0.5 - 2.2 mmol/L       RADIOLOGY:  Xr Hand Right (min 3 Views)    Result Date: 2/10/2021  EXAMINATION: THREE XRAY VIEWS OF THE RIGHT HAND 2/10/2021 7:44 pm COMPARISON: None. HISTORY: ORDERING SYSTEM PROVIDED HISTORY: r/o osteo TECHNOLOGIST PROVIDED HISTORY: Reason for exam:->r/o osteo FINDINGS: There is no evidence of acute fracture. There is normal alignment. No acute joint abnormality. No focal osseous lesion. There is diffuse soft tissue swelling. External dressing noted over the 3rd finger. No acute osseous abnormality. Diffuse soft tissue swelling. Please consider MR or triple phase bone scan and if there is clinical suspicion for osteomyelitis.      ------------------------- NURSING NOTES AND VITALS REVIEWED ---------------------------  Date / Time Roomed:  2/10/2021  8:35 PM  ED Bed Assignment:  27/27    The nursing notes within the ED encounter and vital signs as below have been reviewed. Patient Vitals for the past 24 hrs:   BP Temp Temp src Pulse Resp SpO2 Height Weight   02/10/21 1914 (!) 153/100 97.4 °F (36.3 °C) Tympanic 106 16 93 % 5' 9\" (1.753 m) 290 lb (131.5 kg)       Oxygen Saturation Interpretation: Normal    ------------------------------------------ PROGRESS NOTES ------------------------------------------  Counseling:  I have spoken with the patient and discussed todays results, in addition to providing specific details for the plan of care and counseling regarding the diagnosis and prognosis.   Their questions are answered at this time and they are agreeable with the plan of admission.    --------------------------------- ADDITIONAL PROVIDER NOTES ---------------------------------  This patient's ED course included: a personal history and physicial examination, re-evaluation prior to disposition, multiple bedside re-evaluations, IV medications, cardiac monitoring, continuous pulse oximetry and complex medical decision making and emergency management    This patient has remained hemodynamically stable during their ED course. Diagnosis:  1. Sepsis due to cellulitis [facility])        Disposition:  Patient's disposition: Admit to med/surg floor  Patient's condition is fair.              Delicia Slaughter DO  02/11/21 0041

## 2021-02-11 NOTE — PATIENT CARE CONFERENCE
P Quality Flow/Interdisciplinary Rounds Progress Note        Quality Flow Rounds held on February 11, 2021    Disciplines Attending:  Bedside Nurse, ,  and Nursing Unit Leadership    Ellie Grullon was admitted on 2/10/2021  8:35 PM    Anticipated Discharge Date:  Expected Discharge Date: 02/12/21    Disposition:    Theo Score:  Theo Scale Score: 22    Readmission Risk              Risk of Unplanned Readmission:        7           Discussed patient goal for the day, patient clinical progression, and barriers to discharge.   The following Goal(s) of the Day/Commitment(s) have been identified:  Occupational Therapy - Obtain Consult Order and Physical Therapy - Obtain Consult Order      Amber Grullon  February 11, 2021

## 2021-02-11 NOTE — CARE COORDINATION
Introduced my self and provided explanation of CM role to patient. Patient is awake, alert, and aware of current diagnosis and treatment plan including IV atb while awaiting culture results. Patient resides at home with . She indicates that she was independent with her adl's. In the absence of IV atb, she believes she would have no post discharge needs. If IV atb are required, her preference is to utilize [facility] infusion center. If ordered greater than daily, she will select a [facility] agency. Will follow for ID plans/notes again 2/12/21. If plan undefined, will refer to [facility] agency of choice for benefit check in anticipation of weekend. Explained ELOS of 24-48 hours; patient voiced understanding and agreement. Will follow along with  and assist with discharge planning as necessary. Tamiko Nichole.  Vijay, MSN, RN  [facility] Case Management  [phone]

## 2021-02-12 LAB
ALBUMIN SERPL-MCNC: 3.4 G/DL (ref 3.5–5.2)
ALP BLD-CCNC: 62 U/L (ref 35–104)
ALT SERPL-CCNC: 7 U/L (ref 0–32)
ANION GAP SERPL CALCULATED.3IONS-SCNC: 10 MMOL/L (ref 7–16)
AST SERPL-CCNC: 11 U/L (ref 0–31)
BASOPHILS ABSOLUTE: 0.06 E9/L (ref 0–0.2)
BASOPHILS RELATIVE PERCENT: 0.7 % (ref 0–2)
BILIRUB SERPL-MCNC: 0.3 MG/DL (ref 0–1.2)
BUN BLDV-MCNC: 11 MG/DL (ref 6–20)
CALCIUM SERPL-MCNC: 8.6 MG/DL (ref 8.6–10.2)
CHLORIDE BLD-SCNC: 106 MMOL/L (ref 98–107)
CO2: 23 MMOL/L (ref 22–29)
CREAT SERPL-MCNC: 0.8 MG/DL (ref 0.5–1)
EOSINOPHILS ABSOLUTE: 0.29 E9/L (ref 0.05–0.5)
EOSINOPHILS RELATIVE PERCENT: 3.2 % (ref 0–6)
GFR AFRICAN AMERICAN: >60
GFR NON-AFRICAN AMERICAN: >60 ML/MIN/1.73
GLUCOSE BLD-MCNC: 112 MG/DL (ref 74–99)
HCT VFR BLD CALC: 36.2 % (ref 34–48)
HEMOGLOBIN: 11.7 G/DL (ref 11.5–15.5)
IMMATURE GRANULOCYTES #: 0.02 E9/L
IMMATURE GRANULOCYTES %: 0.2 % (ref 0–5)
LYMPHOCYTES ABSOLUTE: 2.74 E9/L (ref 1.5–4)
LYMPHOCYTES RELATIVE PERCENT: 30.6 % (ref 20–42)
MCH RBC QN AUTO: 28.1 PG (ref 26–35)
MCHC RBC AUTO-ENTMCNC: 32.3 % (ref 32–34.5)
MCV RBC AUTO: 86.8 FL (ref 80–99.9)
MONOCYTES ABSOLUTE: 0.92 E9/L (ref 0.1–0.95)
MONOCYTES RELATIVE PERCENT: 10.3 % (ref 2–12)
NEUTROPHILS ABSOLUTE: 4.92 E9/L (ref 1.8–7.3)
NEUTROPHILS RELATIVE PERCENT: 55 % (ref 43–80)
PDW BLD-RTO: 13.5 FL (ref 11.5–15)
PLATELET # BLD: 257 E9/L (ref 130–450)
PMV BLD AUTO: 10.7 FL (ref 7–12)
POTASSIUM REFLEX MAGNESIUM: 3.7 MMOL/L (ref 3.5–5)
RBC # BLD: 4.17 E12/L (ref 3.5–5.5)
SODIUM BLD-SCNC: 139 MMOL/L (ref 132–146)
TOTAL PROTEIN: 6 G/DL (ref 6.4–8.3)
WBC # BLD: 9 E9/L (ref 4.5–11.5)

## 2021-02-12 PROCEDURE — 36415 COLL VENOUS BLD VENIPUNCTURE: CPT

## 2021-02-12 PROCEDURE — 6360000002 HC RX W HCPCS: Performed by: INTERNAL MEDICINE

## 2021-02-12 PROCEDURE — 6370000000 HC RX 637 (ALT 250 FOR IP): Performed by: INTERNAL MEDICINE

## 2021-02-12 PROCEDURE — 2580000003 HC RX 258: Performed by: INTERNAL MEDICINE

## 2021-02-12 PROCEDURE — 1200000000 HC SEMI PRIVATE

## 2021-02-12 PROCEDURE — 85025 COMPLETE CBC W/AUTO DIFF WBC: CPT

## 2021-02-12 PROCEDURE — 80053 COMPREHEN METABOLIC PANEL: CPT

## 2021-02-12 RX ORDER — ACETAMINOPHEN 650 MG
TABLET, EXTENDED RELEASE ORAL PRN
Status: DISCONTINUED | OUTPATIENT
Start: 2021-02-12 | End: 2021-02-15 | Stop reason: HOSPADM

## 2021-02-12 RX ADMIN — MAGNESIUM 64 MG (MAGNESIUM CHLORIDE) TABLET,DELAYED RELEASE 64 MG: at 08:36

## 2021-02-12 RX ADMIN — SODIUM CHLORIDE, PRESERVATIVE FREE 10 ML: 5 INJECTION INTRAVENOUS at 18:27

## 2021-02-12 RX ADMIN — ESCITALOPRAM OXALATE 10 MG: 10 TABLET ORAL at 08:36

## 2021-02-12 RX ADMIN — ENOXAPARIN SODIUM 40 MG: 40 INJECTION SUBCUTANEOUS at 08:42

## 2021-02-12 RX ADMIN — ACETAMINOPHEN 1000 MG: 500 TABLET ORAL at 02:35

## 2021-02-12 RX ADMIN — HYDROCODONE BITARTRATE AND ACETAMINOPHEN 1 TABLET: 5; 325 TABLET ORAL at 08:36

## 2021-02-12 RX ADMIN — TOPIRAMATE 25 MG: 25 TABLET, FILM COATED ORAL at 08:36

## 2021-02-12 RX ADMIN — DOXYCYCLINE HYCLATE 100 MG: 100 CAPSULE ORAL at 08:36

## 2021-02-12 RX ADMIN — CHOLECALCIFEROL TAB 10 MCG (400 UNIT) 800 UNITS: 10 TAB at 08:36

## 2021-02-12 RX ADMIN — ASPIRIN 81 MG: 81 TABLET, COATED ORAL at 08:36

## 2021-02-12 RX ADMIN — Medication 10 ML: at 20:14

## 2021-02-12 RX ADMIN — HYDROCODONE BITARTRATE AND ACETAMINOPHEN 1 TABLET: 5; 325 TABLET ORAL at 20:14

## 2021-02-12 RX ADMIN — Medication 2000 MG: at 02:31

## 2021-02-12 RX ADMIN — HYDROCODONE BITARTRATE AND ACETAMINOPHEN 1 TABLET: 5; 325 TABLET ORAL at 14:12

## 2021-02-12 RX ADMIN — Medication 2000 MG: at 10:03

## 2021-02-12 RX ADMIN — Medication 10 ML: at 10:03

## 2021-02-12 RX ADMIN — Medication 2000 MG: at 18:26

## 2021-02-12 ASSESSMENT — PAIN SCALES - GENERAL
PAINLEVEL_OUTOF10: 6
PAINLEVEL_OUTOF10: 0
PAINLEVEL_OUTOF10: 5

## 2021-02-12 NOTE — CARE COORDINATION
Met with patient, discussed post dc options. ID plan remains undefined until cultures return. If po, plan home no needs. If daily IV atb, patient desires [facility] infusion center. Will need to initiate referral once script written. [facility] referral initiated per choice in the event HHC is needed for IV atb greater than daily at home. Carolyn Castillo.  Vijay, MSN, RN  Health system Case Management  [phone]

## 2021-02-12 NOTE — PATIENT CARE CONFERENCE
[facility] Quality Flow/Interdisciplinary Rounds Progress Note        Quality Flow Rounds held on February 12, 2021    Disciplines Attending:  Bedside Nurse, ,  and Nursing Unit Leadership    Adrian Santos was admitted on 2/10/2021  8:35 PM    Anticipated Discharge Date:  Expected Discharge Date: 02/12/21    Disposition:    Theo Score:  Theo Scale Score: 22    Readmission Risk              Risk of Unplanned Readmission:        8           Discussed patient goal for the day, patient clinical progression, and barriers to discharge.   The following Goal(s) of the Day/Commitment(s) have been identified:  Diagnostics - Report Results and Labs - Report Results      Gareth Mcclelland  February 12, 2021

## 2021-02-12 NOTE — PROGRESS NOTES
[address] [place] [address] [facility]  Progress Note      Chief Complaint   Patient presents with    Wound Check     right hand, started on friday a small white pimple, had someone jennifer it yesterday, now has swelling, chills and fever    Fever       SUBJECTIVE:      Patient is tolerating medications. No reported adverse drug reactions. No problems overnight. Patient feels somewhat better but still significant tenderness. Review of systems:    As stated above in the chief complaint, otherwise negative. Medications:    Scheduled Meds:   ceFAZolin  2,000 mg Intravenous Q8H    doxycycline hyclate  100 mg Oral 2 times per day    amLODIPine  5 mg Oral QAM    aspirin  81 mg Oral QAM    escitalopram  10 mg Oral QAM    magnesium chloride  64 mg Oral QAM    topiramate  25 mg Oral QAM AC    vitamin D3  800 Units Oral QAM    sodium chloride flush  10 mL Intravenous 2 times per day    enoxaparin  40 mg Subcutaneous Daily     Continuous Infusions:  PRN Meds:HYDROcodone 5 mg - acetaminophen, acetaminophen, sodium chloride flush, promethazine **OR** ondansetron, polyethylene glycol  Prior to Admission medications    Medication Sig Start Date End Date Taking? Authorizing Provider   HYDROcodone-acetaminophen (NORCO) 5-325 MG per tablet Take 1 tablet by mouth every 6 hours as needed for Pain.  Last dose 2/10/21 [email or web address]   Yes Historical Provider, MD   sulfamethoxazole-trimethoprim (BACTRIM DS;SEPTRA DS) 800-160 MG per tablet Take 1 tablet by mouth 2 times daily   Yes Historical Provider, MD   topiramate (TOPAMAX) 25 MG tablet Take 25 mg by mouth every morning (before breakfast)   Yes Historical Provider, MD   acetaminophen (TYLENOL) 500 MG tablet Take 1,000 mg by mouth every 6 hours as needed for Pain   Yes Historical Provider, MD   hydrochlorothiazide (HYDRODIURIL) 12.5 MG tablet Take 12.5 mg by mouth every morning  4/10/18  Yes Historical Provider, MD   aspirin 81 MG EC tablet Take 1 tablet by mouth 2 times daily  Patient taking differently: Take 81 mg by mouth every morning  18  Yes Bill Perez MD   vitamin D3 (CHOLECALCIFEROL) 400 units TABS tablet Take 800 Units by mouth every morning    Yes Historical Provider, MD   Magnesium 100 MG CAPS Take 1 capsule by mouth every morning    Yes Historical Provider, MD       OBJECTIVE:  /60   Pulse 77   Temp 97.5 °F (36.4 °C) (Oral)   Resp 18   Ht 5' 8\" (1.727 m)   Wt 298 lb 6.4 oz (135.4 kg)   LMP 2018   SpO2 97%   BMI 45.37 kg/m²   Temp  Av.7 °F (36.5 °C)  Min: 97.5 °F (36.4 °C)  Max: 97.9 °F (36.6 °C)  General appearance: Resting in bed in no apparent distress. Skin: Warm and dry. No rashes were noted. HEENT: Round and reactive pupils. Moist mucous membranes. No ulcerations or thrush. Neck: Supple to movements. Chest: No use of accessory muscles to breathe. Symmetrical expansion. No wheezing, crackles or rhonchi. Cardiovascular: Regular rate and rhythm. No murmurs gallops, or rubs appreciated. Abdomen: Bowel sounds present, nontender, nondistended, no masses or hepatosplenomegaly. Extremities: Wound right middle finger still erythematous with some edema, erythema improving  Lines: peripheral  Neuro: No significant sensory or motor deficits noted    I/O last 3 completed shifts: In: 0299 [P.O.:420;  I.V.:1087]  Out: -       Laboratory and Tests Review:      Lab Results   Component Value Date    WBC 9.0 2021    WBC 14.7 (H) 2021    WBC 16.8 (H) 02/10/2021    HGB 11.7 2021    HCT 36.2 2021    MCV 86.8 2021     2021     Lab Results   Component Value Date    NEUTROABS 4.92 2021    NEUTROABS 11.06 (H) 2021    NEUTROABS 12.73 (H) 02/10/2021     Lab Results   Component Value Date    CRPHS 6.2 (H) 2019     Lab Results   Component Value Date    ALT 7 2021    AST 11 2021    ALKPHOS 62 2021    BILITOT 0.3 2021     Lab Results   Component Value Date     02/12/2021    K 3.7 02/12/2021     02/12/2021    CO2 23 02/12/2021    BUN 11 02/12/2021    CREATININE 0.8 02/12/2021    CREATININE 0.9 02/11/2021    CREATININE 1.0 02/10/2021    GFRAA >60 02/12/2021    LABGLOM >60 02/12/2021    GLUCOSE 112 02/12/2021    PROT 6.0 02/12/2021    LABALBU 3.4 02/12/2021    CALCIUM 8.6 02/12/2021    BILITOT 0.3 02/12/2021    ALKPHOS 62 02/12/2021    AST 11 02/12/2021    ALT 7 02/12/2021     Lab Results   Component Value Date    CRP 0.7 (H) 02/10/2021     Lab Results   Component Value Date    SEDRATE 20 02/10/2021    SEDRATE 25 (H) 04/07/2018       Radiology:    XR HAND RIGHT (MIN 3 VIEWS)   Final Result   No acute osseous abnormality. Diffuse soft tissue swelling. Please consider MR or triple phase bone scan and if there is clinical   suspicion for osteomyelitis. Microbiology:   Lab Results   Component Value Date    BC 24 Hours no growth 02/10/2021     Lab Results   Component Value Date    BLOODCULT2 24 Hours no growth 02/10/2021     No results found for: WNDABS  No results found for: RESPSMEAR  No results found for: MPNEUMO, CLAMYDCU, LABLEGI, AFBCX, FUNGSM, LABFUNG  No results found for: CULTRESP  No results found for: CXCATHTIP  No results found for: BFCS  No results found for: CXSURG  No results found for: LABURIN  No results found for: [facility]    Problem list:    Active Problems:    Cellulitis and abscess of upper extremity  Resolved Problems:    * No resolved hospital problems.  *      ASSESSMENT:    · Cellulitis with abscess right middle finger status post incision and drainage  · Wound culture positive for staph aureus (Jones micro) MSSA  · Systemic inflammatory response syndrome    PLAN:    · Continue Ancef 2 g IV every 8 hours  · Stop doxycycline  · Check final cultures  · We will continue IV antibiotics for additional 24 hours and pending clinical response determine whether patient will need IV antibiotics at home  · Monitor labs  · Will follow with you      Aide Regan    10:01 AM  2/12/2021

## 2021-02-12 NOTE — CONSULTS
Department of Orthopedic Surgery  Attending Consult Note        Reason for Consult:  Right hand middle finger abscess  Requesting Physician:  Dr Jorge Records:  Right hand middle finger pain    History Obtained From:  patient    HISTORY OF PRESENT ILLNESS:                The patient is a 44 y.o. female who is very well known to me. 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 Denied fever or chills. She was started on Bactrim at that point. On tues, two small punctate areas of puss developed and I was concerned about potential spider bite although she is not aware of any contact. Punctate areas were unroof by PA at the office with a needle and drained with cultures being sent. Patient sent home and utilized warm soaks. However, yesterday,  Local swelling was worse and purulent material was still felt to be present in the wound. A digital block was performed and sharp incisional debridement was performed with probing with a sterile swab that did not reveal any deep tracking. No concern for tenosynovitis was appreciated on exam.  Patient went home and later in the day was not feeling well, had a temp of 99, and increased swelling. Therefore she was instructed to go to ED for labs and admission for IV abx.   .    Past Medical History:        Diagnosis Date    Arthritis     Headache     LEFT SIDE LAST 2 DAYS    Hypertension     Left-sided headache     Left-sided weakness     ESPECIALLY FINE MOTOR/ARM    Numbness     UPPER LEG & ARM ON LEFT SIDE    TIA (transient ischemic attack) 04/06/2018    Trouble swallowing     THICK OR DRY FOODS     Past Surgical History:        Procedure Laterality Date    ADRENALECTOMY      BREAST SURGERY      ENDOMETRIAL ABLATION      TONSILLECTOMY AND ADENOIDECTOMY       Current Medications:   Current Facility-Administered Medications: ceFAZolin (ANCEF) 2000 mg in sterile water 20 mL IV syringe, 2,000 mg, Intravenous, Q8H  doxycycline hyclate (VIBRAMYCIN) capsule 100 mg, 100 mg, Oral, 2 times per day  HYDROcodone-acetaminophen (NORCO) 5-325 MG per tablet 1 tablet, 1 tablet, Oral, Q4H PRN  acetaminophen (TYLENOL) tablet 1,000 mg, 1,000 mg, Oral, Q6H PRN  amLODIPine (NORVASC) tablet 5 mg, 5 mg, Oral, QAM  aspirin EC tablet 81 mg, 81 mg, Oral, QAM  escitalopram (LEXAPRO) tablet 10 mg, 10 mg, Oral, QAM  magnesium chloride (MAG DELAY) extended release tablet 64 mg, 64 mg, Oral, QAM  topiramate (TOPAMAX) tablet 25 mg, 25 mg, Oral, QAM AC  vitamin D3 (CHOLECALCIFEROL) tablet 800 Units, 800 Units, Oral, QAM  sodium chloride flush 0.9 % injection 10 mL, 10 mL, Intravenous, 2 times per day  sodium chloride flush 0.9 % injection 10 mL, 10 mL, Intravenous, PRN  enoxaparin (LOVENOX) injection 40 mg, 40 mg, Subcutaneous, Daily  promethazine (PHENERGAN) tablet 12.5 mg, 12.5 mg, Oral, Q6H PRN **OR** ondansetron (ZOFRAN) injection 4 mg, 4 mg, Intravenous, Q6H PRN  polyethylene glycol (GLYCOLAX) packet 17 g, 17 g, Oral, Daily PRN  Allergies:  Bee venom and Sudafed [pseudoephedrine hcl]    Social History:   TOBACCO:  Never used tobacco  ETOH:  Never drank alcohol  MARITAL STATUS:    OCCUPATION:  Employed at my office  Family History:       Problem Relation Age of Onset    Diabetes Mother     Arthritis Mother     Heart Disease Father     Thyroid Disease Father     Allergy (Severe) Father     Bleeding Prob Father      REVIEW OF SYSTEMS:    CONSTITUTIONAL:  positive for  fevers and chills  RESPIRATORY:  negative  CARDIOVASCULAR:  positive for  edema  MUSCULOSKELETAL:  positive for  myalgias and Right long finger pain  NEUROLOGICAL:  negative  BEHAVIOR/PSYCH:  negative    PHYSICAL EXAM:    VITALS:  /61   Pulse 81   Temp 97.9 °F (36.6 °C) (Oral)   Resp 16   Ht 5' 8\" (1.727 m)   Wt 290 lb 8 oz (131.8 kg)   LMP 02/13/2018   SpO2 97%   BMI 44.17 kg/m²   24HR INTAKE/OUTPUT:    Intake/Output Summary (Last 24 hours) at 2/12/2021 0021  Last data filed at 2/11/2021 1844  Gross per 24 hour   Intake 2956 ml   Output 1000 ml   Net 1956 ml     CONSTITUTIONAL:  awake, alert, cooperative, no apparent distress, and appears stated age  LUNGS:  no increased work of breathing and good air exchange  CARDIOVASCULAR:  pulses 2 plus all extermities bilaterally  MUSCULOSKELETAL:  Right hand long finger wound on dorsal radial proximal phalanx without pain on gentle passive finger flexion and extension. No evidence of septic tenosynovitis. NO pain at PIP or MCP joint on direct palpation or gentle PROM. + enduration and erythema at abscess site with some sloughing of skin around it.  + dorsal hand swelling worse compared to earlier yesterday. + cellulitis outline from ED and improving. No pain with PROM wrist.  NEUROLOGIC:  Awake, alert, oriented to name, place and time. Cranial nerves II-XII are grossly intact. Motor is 5 out of 5 bilaterally. Sensory is intact. SKIN:  See above    DATA:    CBC:   2/10/2021  8:09 PM - Noel, Mhy Incoming Results From [facility]    Component Value Ref Range & Units Status Collected Lab   WBC 16. 8High   4.5 - 11.5 E9/L Final 02/10/2021  7:52 PM [facility]   RBC 4.85  3.50 - 5.50 E12/L Final 02/10/2021  7:52 PM [facility]   Hemoglobin 14.0  11.5 - 15.5 g/dL Final 02/10/2021  7:52 PM [facility]   Hematocrit 41.0  34.0 - 48.0 % Final 02/10/2021  7:52 PM [facility]   MCV 84.5  80.0 - 99.9 fL Final 02/10/2021  7:52 PM [facility]   MCH 28.9  26.0 - 35.0 pg Final 02/10/2021  7:52 PM [facility]   MCHC 34.1  32.0 - 34.5 % Final 02/10/2021  7:52 PM [facility]   RDW 13.3  11.5 - 15.0 fL Final 02/10/2021  7:52 PM [facility]   Platelets 997  487 - 450 E9/L Final 02/10/2021  7:52 PM [facility]   MPV 10.6  7.0 - 12.0 fL Final 02/10/2021  7:52 PM [facility]   Neutrophils % 75.7  43.0 - 80.0 % Final 02/10/2021  7:52 PM [facility]   Immature Granulocytes % 0.3  0.0 - 5.0 % Final 02/10/2021  7:52 PM [facility]   Lymphocytes % 15.5Low   20.0 - 42.0 % Final 02/10/2021  7:52 PM [facility]   Monocytes % 7.2  2.0 - 12.0 % Final 02/10/2021  7:52 PM [facility]   Eosinophils % 0.8  0.0 - 6.0 % Final 02/10/2021  7:52 PM [facility]   Basophils % 0.5  0.0 - 2.0 % Final 02/10/2021  7:52 PM [facility]   Neutrophils Absolute 12. 73High   1.80 - 7.30 E9/L Final 02/10/2021  7:52 PM [facility]   Immature Granulocytes # 0.05  E9/L Final 02/10/2021  7:52 PM [facility]   Lymphocytes Absolute 2.60  1.50 - 4.00 E9/L Final 02/10/2021  7:52 PM [facility]   Monocytes Absolute 1. 21High   0.10 - 0.95 E9/L Final 02/10/2021  7:52 PM [facility]   Eosinophils Absolute 0.14  0.05 - 0.50 E9/L Final 02/10/2021  7:52 PM [facility]   Basophils Absolute 0.09              Lab Results   Component Value Date    WBC 14.7 02/11/2021    RBC 4.72 02/11/2021    HGB 13.0 02/11/2021    HCT 40.8 02/11/2021    MCV 86.4 02/11/2021    MCH 27.5 02/11/2021    MCHC 31.9 02/11/2021    RDW 13.3 02/11/2021     02/11/2021    MPV 10.6 02/11/2021     BMP:    Lab Results   Component Value Date     02/11/2021    K 3.6 02/11/2021     02/11/2021    CO2 24 02/11/2021    BUN 12 02/11/2021    LABALBU 4.3 02/10/2021    CREATININE 0.9 02/11/2021    CALCIUM 9.0 02/11/2021    GFRAA >60 02/11/2021    LABGLOM >60 02/11/2021    GLUCOSE 115 02/11/2021   Lactate 1.0  CRP  0.7  Esr 20    Radiology Review:  No fracture or dislocation.   + soft tissue swelling right long finger    IMPRESSION/RECOMMENDATIONS:    Right hand long finger soft tissue abscess with GPC    Admitted for IV ABX  ID consult pending and appreciated  Elevate hand-  Pillow folded and placed  No evidence of septic tenosynovitis clinically seen. Continue to monitor this.   Dressing on wound  Will follow    Lydia Salinas [facility]

## 2021-02-12 NOTE — PROGRESS NOTES
Admit Date: 2/10/2021    Subjective:     Patient seen. Still reports pain at times requiring Norco. Keeping forearm elevated. Some chills, nausea, mild headache. Scheduled Meds:   ceFAZolin  2,000 mg Intravenous Q8H    doxycycline hyclate  100 mg Oral 2 times per day    amLODIPine  5 mg Oral QAM    aspirin  81 mg Oral QAM    escitalopram  10 mg Oral QAM    magnesium chloride  64 mg Oral QAM    topiramate  25 mg Oral QAM AC    vitamin D3  800 Units Oral QAM    sodium chloride flush  10 mL Intravenous 2 times per day    enoxaparin  40 mg Subcutaneous Daily     Continuous Infusions:  PRN Meds:HYDROcodone 5 mg - acetaminophen, acetaminophen, sodium chloride flush, promethazine **OR** ondansetron, polyethylene glycol      Objective:     I/O last 3 completed shifts: In: 2650 [P.O.:420; I.V.:1087]  Out: -   No intake/output data recorded. /61   Pulse 81   Temp 97.9 °F (36.6 °C) (Oral)   Resp 16   Ht 5' 8\" (1.727 m)   Wt 298 lb 6.4 oz (135.4 kg)   LMP 02/13/2018   SpO2 97%   BMI 45.37 kg/m²     Neck: no adenopathy, no carotid bruit, no JVD, supple, symmetrical, trachea midline. Prominent thyroid, no tenderness/mass/nodules  Lungs: Clear to A and P  Heart: regular rate and rhythm, S1, S2 normal, no murmur, regular rate and rhythm ,no precordial heave  Abdomen:soft, non-tender; non-distended normal bowel sounds no masses, no organomegaly  Extremities:-CCE,Homans sign is negative, no sign of DVT. R 3rd digit with open wound. No current drainage. Edema extends to dorsal aspect of hand.  Wrist, Forearm appear normal. She can flex, extend digits though restricted by edema      Data Review    CBC with Differential:    Lab Results   Component Value Date    WBC 9.0 02/12/2021    RBC 4.17 02/12/2021    HGB 11.7 02/12/2021    HCT 36.2 02/12/2021     02/12/2021    MCV 86.8 02/12/2021    MCH 28.1 02/12/2021    MCHC 32.3 02/12/2021    RDW 13.5 02/12/2021    LYMPHOPCT 30.6 02/12/2021    MONOPCT 10.3 02/12/2021    BASOPCT 0.7 02/12/2021    MONOSABS 0.92 02/12/2021    LYMPHSABS 2.74 02/12/2021    EOSABS 0.29 02/12/2021    BASOSABS 0.06 02/12/2021     CMP:    Lab Results   Component Value Date     02/12/2021    K 3.7 02/12/2021     02/12/2021    CO2 23 02/12/2021    BUN 11 02/12/2021    CREATININE 0.8 02/12/2021    GFRAA >60 02/12/2021    LABGLOM >60 02/12/2021    GLUCOSE 112 02/12/2021    PROT 6.0 02/12/2021    LABALBU 3.4 02/12/2021    CALCIUM 8.6 02/12/2021    BILITOT 0.3 02/12/2021    ALKPHOS 62 02/12/2021    AST 11 02/12/2021    ALT 7 02/12/2021       XR HAND RIGHT (MIN 3 VIEWS)   Final Result   No acute osseous abnormality. Diffuse soft tissue swelling. Please consider MR or triple phase bone scan and if there is clinical   suspicion for osteomyelitis. Assessment:        -Cellulitis/abscess R middle finger and hand. Post outpatient I+D. -Hypertension  -Hx migraine headache  -Hx vertebrobasilar TIA  -KARL on CPAP  -Prediabetes. Plan:     Lab improving. Continues IV Ancef, po doxycycline pending cultures. Home when ok with ID with appropriate atb regimen. Follow labs.       Electronically signed by Ryan Loera MD on 2/12/2021 at 7:49 AM

## 2021-02-12 NOTE — PROGRESS NOTES
Surgical Service- Orthopaedics  Physician Assistant  Progress Note      Alexus Marcellus  2/12/2021  1:37 PM    SUBJECTIVE:  Arslan Chou is doing well this morning, some mild nausea which she states is improving. She does have pain in her right finger which is controlled with Norco. Throbbing when her hand hangs down, but pain improves with elevation. No fevers chills or night sweats. OBJECTIVE:      VITALS:  /60   Pulse 77   Temp 97.5 °F (36.4 °C) (Oral)   Resp 18   Ht 5' 8\" (1.727 m)   Wt 298 lb 6.4 oz (135.4 kg)   LMP 02/13/2018   SpO2 97%   BMI 45.37 kg/m²     Physical Exam: AAOx3, NAD. No increased work of breathing. R hand long finfer wound on dorsal radial proximal phalanx without pain on gentle passive flexion and extension of MCP and PIP/DIP joints. No evidence of septic tenosynovitis. + enduration and erythema at the site of abscess, improved swelling of dorsal hand and no swelling proximal to right wrist. Brisk cap refill. SILT distally.      Data:  CBC with Differential:    Lab Results   Component Value Date    WBC 9.0 02/12/2021    RBC 4.17 02/12/2021    HGB 11.7 02/12/2021    HCT 36.2 02/12/2021     02/12/2021    MCV 86.8 02/12/2021    MCH 28.1 02/12/2021    MCHC 32.3 02/12/2021    RDW 13.5 02/12/2021    LYMPHOPCT 30.6 02/12/2021    MONOPCT 10.3 02/12/2021    BASOPCT 0.7 02/12/2021    MONOSABS 0.92 02/12/2021    LYMPHSABS 2.74 02/12/2021    EOSABS 0.29 02/12/2021    BASOSABS 0.06 02/12/2021     CMP:    Lab Results   Component Value Date     02/12/2021    K 3.7 02/12/2021     02/12/2021    CO2 23 02/12/2021    BUN 11 02/12/2021    CREATININE 0.8 02/12/2021    GFRAA >60 02/12/2021    LABGLOM >60 02/12/2021    GLUCOSE 112 02/12/2021    PROT 6.0 02/12/2021    LABALBU 3.4 02/12/2021    CALCIUM 8.6 02/12/2021    BILITOT 0.3 02/12/2021    ALKPHOS 62 02/12/2021    AST 11 02/12/2021    ALT 7 02/12/2021     Blood Culture:  x2, No growth to date    ASSESSMENT: Right hand long finger soft tissue abscess with Staphylococcus aureus  PLAN:   -Continue abx, ancef and doxycycline, as per ID  -Continue to follow blood cultures, no growth to date  -ID to determine tomorrow if PICC line needed for home IV abx or if patient can d/c on oral abx  -Begin twice daily sterile saline and betadine soaks, 15 mins bid  -Dressing on wound, will continue to follow  -Continue elevation of hand and pain management        Carlota Horton PA-C

## 2021-02-13 LAB
ALBUMIN SERPL-MCNC: 3.8 G/DL (ref 3.5–5.2)
ALP BLD-CCNC: 64 U/L (ref 35–104)
ALT SERPL-CCNC: 6 U/L (ref 0–32)
ANAEROBIC CULTURE: NORMAL
ANION GAP SERPL CALCULATED.3IONS-SCNC: 10 MMOL/L (ref 7–16)
AST SERPL-CCNC: 11 U/L (ref 0–31)
BASOPHILS ABSOLUTE: 0.09 E9/L (ref 0–0.2)
BASOPHILS RELATIVE PERCENT: 0.9 % (ref 0–2)
BILIRUB SERPL-MCNC: 0.2 MG/DL (ref 0–1.2)
BUN BLDV-MCNC: 13 MG/DL (ref 6–20)
CALCIUM SERPL-MCNC: 9 MG/DL (ref 8.6–10.2)
CHLORIDE BLD-SCNC: 101 MMOL/L (ref 98–107)
CO2: 25 MMOL/L (ref 22–29)
CREAT SERPL-MCNC: 0.8 MG/DL (ref 0.5–1)
EOSINOPHILS ABSOLUTE: 0.37 E9/L (ref 0.05–0.5)
EOSINOPHILS RELATIVE PERCENT: 3.6 % (ref 0–6)
GFR AFRICAN AMERICAN: >60
GFR NON-AFRICAN AMERICAN: >60 ML/MIN/1.73
GLUCOSE BLD-MCNC: 100 MG/DL (ref 74–99)
HCT VFR BLD CALC: 39 % (ref 34–48)
HEMOGLOBIN: 12.1 G/DL (ref 11.5–15.5)
IMMATURE GRANULOCYTES #: 0.04 E9/L
IMMATURE GRANULOCYTES %: 0.4 % (ref 0–5)
LYMPHOCYTES ABSOLUTE: 3.14 E9/L (ref 1.5–4)
LYMPHOCYTES RELATIVE PERCENT: 30.4 % (ref 20–42)
MCH RBC QN AUTO: 27.4 PG (ref 26–35)
MCHC RBC AUTO-ENTMCNC: 31 % (ref 32–34.5)
MCV RBC AUTO: 88.4 FL (ref 80–99.9)
MONOCYTES ABSOLUTE: 0.99 E9/L (ref 0.1–0.95)
MONOCYTES RELATIVE PERCENT: 9.6 % (ref 2–12)
NEUTROPHILS ABSOLUTE: 5.69 E9/L (ref 1.8–7.3)
NEUTROPHILS RELATIVE PERCENT: 55.1 % (ref 43–80)
ORGANISM: ABNORMAL
PDW BLD-RTO: 13.3 FL (ref 11.5–15)
PLATELET # BLD: 295 E9/L (ref 130–450)
PMV BLD AUTO: 10.6 FL (ref 7–12)
POTASSIUM REFLEX MAGNESIUM: 3.8 MMOL/L (ref 3.5–5)
RBC # BLD: 4.41 E12/L (ref 3.5–5.5)
SODIUM BLD-SCNC: 136 MMOL/L (ref 132–146)
TOTAL PROTEIN: 6.7 G/DL (ref 6.4–8.3)
WBC # BLD: 10.3 E9/L (ref 4.5–11.5)
WOUND/ABSCESS: ABNORMAL

## 2021-02-13 PROCEDURE — 80053 COMPREHEN METABOLIC PANEL: CPT

## 2021-02-13 PROCEDURE — 6360000002 HC RX W HCPCS: Performed by: INTERNAL MEDICINE

## 2021-02-13 PROCEDURE — 6370000000 HC RX 637 (ALT 250 FOR IP): Performed by: INTERNAL MEDICINE

## 2021-02-13 PROCEDURE — 1200000000 HC SEMI PRIVATE

## 2021-02-13 PROCEDURE — 85025 COMPLETE CBC W/AUTO DIFF WBC: CPT

## 2021-02-13 PROCEDURE — 2580000003 HC RX 258: Performed by: INTERNAL MEDICINE

## 2021-02-13 RX ADMIN — Medication 10 ML: at 08:30

## 2021-02-13 RX ADMIN — MAGNESIUM 64 MG (MAGNESIUM CHLORIDE) TABLET,DELAYED RELEASE 64 MG: at 08:29

## 2021-02-13 RX ADMIN — HYDROCODONE BITARTRATE AND ACETAMINOPHEN 1 TABLET: 5; 325 TABLET ORAL at 02:09

## 2021-02-13 RX ADMIN — TOPIRAMATE 25 MG: 25 TABLET, FILM COATED ORAL at 06:26

## 2021-02-13 RX ADMIN — Medication 10 ML: at 20:14

## 2021-02-13 RX ADMIN — HYDROCODONE BITARTRATE AND ACETAMINOPHEN 1 TABLET: 5; 325 TABLET ORAL at 19:29

## 2021-02-13 RX ADMIN — Medication: at 18:53

## 2021-02-13 RX ADMIN — Medication 2000 MG: at 18:42

## 2021-02-13 RX ADMIN — Medication 2000 MG: at 02:00

## 2021-02-13 RX ADMIN — ASPIRIN 81 MG: 81 TABLET, COATED ORAL at 08:29

## 2021-02-13 RX ADMIN — HYDROCODONE BITARTRATE AND ACETAMINOPHEN 1 TABLET: 5; 325 TABLET ORAL at 14:24

## 2021-02-13 RX ADMIN — CHOLECALCIFEROL TAB 10 MCG (400 UNIT) 800 UNITS: 10 TAB at 08:29

## 2021-02-13 RX ADMIN — Medication 2000 MG: at 10:50

## 2021-02-13 RX ADMIN — SODIUM CHLORIDE, PRESERVATIVE FREE 10 ML: 5 INJECTION INTRAVENOUS at 10:51

## 2021-02-13 RX ADMIN — SODIUM CHLORIDE, PRESERVATIVE FREE 10 ML: 5 INJECTION INTRAVENOUS at 18:42

## 2021-02-13 RX ADMIN — HYDROCODONE BITARTRATE AND ACETAMINOPHEN 1 TABLET: 5; 325 TABLET ORAL at 08:28

## 2021-02-13 ASSESSMENT — PAIN DESCRIPTION - ONSET: ONSET: GRADUAL

## 2021-02-13 ASSESSMENT — PAIN - FUNCTIONAL ASSESSMENT
PAIN_FUNCTIONAL_ASSESSMENT: ACTIVITIES ARE NOT PREVENTED
PAIN_FUNCTIONAL_ASSESSMENT: ACTIVITIES ARE NOT PREVENTED

## 2021-02-13 ASSESSMENT — PAIN SCALES - GENERAL
PAINLEVEL_OUTOF10: 6
PAINLEVEL_OUTOF10: 6
PAINLEVEL_OUTOF10: 8
PAINLEVEL_OUTOF10: 4
PAINLEVEL_OUTOF10: 2

## 2021-02-13 ASSESSMENT — PAIN DESCRIPTION - FREQUENCY
FREQUENCY: INTERMITTENT
FREQUENCY: INTERMITTENT

## 2021-02-13 ASSESSMENT — PAIN DESCRIPTION - LOCATION
LOCATION: FINGER (COMMENT WHICH ONE)
LOCATION: FINGER (COMMENT WHICH ONE)

## 2021-02-13 ASSESSMENT — PAIN DESCRIPTION - ORIENTATION
ORIENTATION: RIGHT;MID
ORIENTATION: RIGHT;MID

## 2021-02-13 ASSESSMENT — PAIN DESCRIPTION - PAIN TYPE: TYPE: ACUTE PAIN

## 2021-02-13 NOTE — PROGRESS NOTES
Department of Orthopedic Surgery  Joint Service  Attending Progress Note        Subjective:  Some pain but improving. She feels finger is getting and feeling better. Vitals  VITALS:  /64   Pulse 82   Temp 98 °F (36.7 °C) (Oral)   Resp 18   Ht 5' 8\" (1.727 m)   Wt 295 lb (133.8 kg)   LMP 02/13/2018   SpO2 98%   BMI 44.85 kg/m²   24HR INTAKE/OUTPUT:    Intake/Output Summary (Last 24 hours) at 2/13/2021 1538  Last data filed at 2/13/2021 1246  Gross per 24 hour   Intake 600 ml   Output --   Net 600 ml       PHYSICAL EXAM:    Orientation:  alert and oriented to person, place and time    Right Upper Extremity     Right long finger wound much less swollen. Wrinkles present today. Less endurated. Opening still present with mild more bloody serous drainage. No purulence appreciated. Still dark red and cellulitic but no pain with gentle passive flexion and extension of finger. Palm discomfort not present on direct palpation.               Good cap refill    2+ radial / ulnar pulses  compartments soft with no pain with passive stretch    LABS:    HgB:    Lab Results   Component Value Date    HGB 12.1 02/13/2021     INR:  No results found for: PTINR  CBC:   Lab Results   Component Value Date    WBC 10.3 02/13/2021    RBC 4.41 02/13/2021    HGB 12.1 02/13/2021    HCT 39.0 02/13/2021    MCV 88.4 02/13/2021    MCH 27.4 02/13/2021    MCHC 31.0 02/13/2021    RDW 13.3 02/13/2021     02/13/2021    MPV 10.6 02/13/2021     BMP:    Lab Results   Component Value Date     02/13/2021    K 3.8 02/13/2021     02/13/2021    CO2 25 02/13/2021    BUN 13 02/13/2021    LABALBU 3.8 02/13/2021    CREATININE 0.8 02/13/2021    CALCIUM 9.0 02/13/2021    GFRAA >60 02/13/2021    LABGLOM >60 02/13/2021    GLUCOSE 100 02/13/2021     Blood Culture:  Negative to date  Wound Culture:  MSSA    ASSESSMENT AND PLAN:    HD # 3 with IV Abx for MSSA right long finger soft tissue abscess without tenosynovitis    Wound improving slowly. Discussed with patient and she feels wound and symptoms are better today. I agree with wound looking better but certainly not vastly better. No evidence of compartment syndrome, infection extension, or flexor/ extensor septic tenosynovitis. Will watch overnight further for need of further debridement. Continue betadine warm soaks. Afebrile and WBC normal    Discussed plan with Dr Shannan Vila. Current plan will be likely home Monday with 7-10 days after PICC line placed. Patient agreeable to plan. Will re-assess tomorrow.        Anderson Parr

## 2021-02-13 NOTE — PROGRESS NOTES
Subjective: The patient is awake and alert. No problems overnight. Denies chest pain, angina, and dyspnea. Denies abdominal pain. Tolerating diet. No nausea or vomiting. Objective:    /69   Pulse 73   Temp 98 °F (36.7 °C) (Oral)   Resp 16   Ht 5' 8\" (1.727 m)   Wt 295 lb (133.8 kg)   LMP 02/13/2018   SpO2 93%   BMI 44.85 kg/m²     Heart:  RRR, no murmurs, gallops, or rubs.   Lungs:  CTA bilaterally, no wheeze, rales or rhonchi  Abd: bowel sounds present, nontender, nondistended, no masses  Extrem:  No clubbing, cyanosis, or edema    CBC with Differential:    Lab Results   Component Value Date    WBC 10.3 02/13/2021    RBC 4.41 02/13/2021    HGB 12.1 02/13/2021    HCT 39.0 02/13/2021     02/13/2021    MCV 88.4 02/13/2021    MCH 27.4 02/13/2021    MCHC 31.0 02/13/2021    RDW 13.3 02/13/2021    LYMPHOPCT 30.4 02/13/2021    MONOPCT 9.6 02/13/2021    BASOPCT 0.9 02/13/2021    MONOSABS 0.99 02/13/2021    LYMPHSABS 3.14 02/13/2021    EOSABS 0.37 02/13/2021    BASOSABS 0.09 02/13/2021     CMP:    Lab Results   Component Value Date     02/13/2021    K 3.8 02/13/2021     02/13/2021    CO2 25 02/13/2021    BUN 13 02/13/2021    CREATININE 0.8 02/13/2021    GFRAA >60 02/13/2021    LABGLOM >60 02/13/2021    GLUCOSE 100 02/13/2021    PROT 6.7 02/13/2021    LABALBU 3.8 02/13/2021    CALCIUM 9.0 02/13/2021    BILITOT 0.2 02/13/2021    ALKPHOS 64 02/13/2021    AST 11 02/13/2021    ALT 6 02/13/2021     PT/INR:    Lab Results   Component Value Date    PROTIME 12.4 05/21/2019    INR 1.1 05/21/2019     @cktotal:3,ckmb:3,ckmbindex:3,troponini:3@  U/A:  No results found for: NITRU, COLORU, PHUR, LABCAST, WBCUA, RBCUA, MUCUS, TRICHOMONAS, YEAST, BACTERIA, CLARITYU, SPECGRAV, UROBILINOGEN, BILIRUBINUR, BLOODU, GLUCOSEU, KETUA, AMORPHOUS     Assessment:    Patient Active Problem List   Diagnosis    Morbid obesity with BMI of 40.0-44.9, adult ([facility] [address]    Essential hypertension    Hyperlipidemia LDL goal <100    Altered mental status    Cellulitis and abscess of upper extremity       Plan:    Cellulitis and abscess finger-  On ATB  Growing MSSA  ?  If needs repeat debridement  Want to hold discharge for today  Nadia Kam DO  5:42 AM  2/13/2021

## 2021-02-13 NOTE — PROGRESS NOTES
[address] [place] [address] [facility]  Progress Note      Chief Complaint   Patient presents with    Wound Check     right hand, started on friday a small white pimple, had someone jennifer it yesterday, now has swelling, chills and fever    Fever       SUBJECTIVE:      Patient is tolerating medications. No reported adverse drug reactions. No problems overnight. Feels hand is slightly improving but still very tender. Review of systems:    As stated above in the chief complaint, otherwise negative. Medications:    Scheduled Meds:   ceFAZolin  2,000 mg Intravenous Q8H    amLODIPine  5 mg Oral QAM    aspirin  81 mg Oral QAM    escitalopram  10 mg Oral QAM    magnesium chloride  64 mg Oral QAM    topiramate  25 mg Oral QAM AC    vitamin D3  800 Units Oral QAM    sodium chloride flush  10 mL Intravenous 2 times per day    enoxaparin  40 mg Subcutaneous Daily     Continuous Infusions:  PRN Meds:povidone-iodine, HYDROcodone 5 mg - acetaminophen, acetaminophen, sodium chloride flush, promethazine **OR** ondansetron, polyethylene glycol  Prior to Admission medications    Medication Sig Start Date End Date Taking? Authorizing Provider   HYDROcodone-acetaminophen (NORCO) 5-325 MG per tablet Take 1 tablet by mouth every 6 hours as needed for Pain.  Last dose 2/10/21 [email or web address]   Yes Historical Provider, MD   sulfamethoxazole-trimethoprim (BACTRIM DS;SEPTRA DS) 800-160 MG per tablet Take 1 tablet by mouth 2 times daily   Yes Historical Provider, MD   topiramate (TOPAMAX) 25 MG tablet Take 25 mg by mouth every morning (before breakfast)   Yes Historical Provider, MD   acetaminophen (TYLENOL) 500 MG tablet Take 1,000 mg by mouth every 6 hours as needed for Pain   Yes Historical Provider, MD   hydrochlorothiazide (HYDRODIURIL) 12.5 MG tablet Take 12.5 mg by mouth every morning  4/10/18  Yes Historical Provider, MD   aspirin 81 MG EC tablet Take 1 tablet by mouth 2 times daily  Patient taking differently: Take 81 mg by mouth every morning  18  Yes Terri Felder MD   vitamin D3 (CHOLECALCIFEROL) 400 units TABS tablet Take 800 Units by mouth every morning    Yes Historical Provider, MD   Magnesium 100 MG CAPS Take 1 capsule by mouth every morning    Yes Historical Provider, MD       OBJECTIVE:  /64   Pulse 82   Temp 98 °F (36.7 °C) (Oral)   Resp 18   Ht 5' 8\" (1.727 m)   Wt 295 lb (133.8 kg)   LMP 2018   SpO2 98%   BMI 44.85 kg/m²   Temp  Av °F (36.7 °C)  Min: 98 °F (36.7 °C)  Max: 98 °F (36.7 °C)  General appearance: Resting in bed in no apparent distress. Skin: Warm and dry. No rashes were noted. HEENT: Round and reactive pupils. Moist mucous membranes. No ulcerations or thrush. Neck: Supple to movements. Chest: No use of accessory muscles to breathe. Symmetrical expansion. No wheezing, crackles or rhonchi. Cardiovascular: Regular rate and rhythm. No murmurs gallops, or rubs appreciated. Abdomen: Bowel sounds present, nontender, nondistended, no masses or hepatosplenomegaly. Extremities: Wound right hand still inflamed, tender-see comparative photos  Lines: peripheral  Neuro: No significant sensory or motor deficits noted                    I/O last 3 completed shifts:   In: 720 [P.O.:720]  Out: -       Laboratory and Tests Review:      Lab Results   Component Value Date    WBC 10.3 2021    WBC 9.0 2021    WBC 14.7 (H) 2021    HGB 12.1 2021    HCT 39.0 2021    MCV 88.4 2021     2021     Lab Results   Component Value Date    NEUTROABS 5.69 2021    NEUTROABS 4.92 2021    NEUTROABS 11.06 (H) 2021     Lab Results   Component Value Date    CRPHS 6.2 (H) 2019     Lab Results   Component Value Date    ALT 6 2021    AST 11 2021    ALKPHOS 64 2021    BILITOT 0.2 2021     Lab Results   Component Value Date     2021    K 3.8 2021     2021 CO2 25 02/13/2021    BUN 13 02/13/2021    CREATININE 0.8 02/13/2021    CREATININE 0.8 02/12/2021    CREATININE 0.9 02/11/2021    GFRAA >60 02/13/2021    LABGLOM >60 02/13/2021    GLUCOSE 100 02/13/2021    PROT 6.7 02/13/2021    LABALBU 3.8 02/13/2021    CALCIUM 9.0 02/13/2021    BILITOT 0.2 02/13/2021    ALKPHOS 64 02/13/2021    AST 11 02/13/2021    ALT 6 02/13/2021     Lab Results   Component Value Date    CRP 0.7 (H) 02/10/2021     Lab Results   Component Value Date    SEDRATE 20 02/10/2021    SEDRATE 25 (H) 04/07/2018       Radiology:    XR HAND RIGHT (MIN 3 VIEWS)   Final Result   No acute osseous abnormality. Diffuse soft tissue swelling. Please consider MR or triple phase bone scan and if there is clinical   suspicion for osteomyelitis. Microbiology:   Lab Results   Component Value Date    BC 24 Hours no growth 02/10/2021    ORG Staphylococcus aureus 02/10/2021     Lab Results   Component Value Date    BLOODCULT2 24 Hours no growth 02/10/2021    ORG Staphylococcus aureus 02/10/2021     WOUND/ABSCESS   Date Value Ref Range Status   02/10/2021 Moderate growth  Final     Source: HAND       Site: Right             Susceptibility    Staphylococcus aureus (1)    Antibiotic Interpretation HARRIS Status    clindamycin Sensitive ^0. 25 mcg/mL     doxycycline Sensitive <=^0.5 mcg/mL     erythromycin Resistant >=^8 mcg/mL     gentamicin Sensitive <=^0.5 mcg/mL     oxacillin Sensitive ^0.5 mcg/mL     trimethoprim-sulfamethoxazole Sensitive <=^10 mcg/mL     vancomycin Sensitive ^1 mcg/mL         Problem list:    Active Problems:    Cellulitis and abscess of upper extremity  Resolved Problems:    * No resolved hospital problems.  *      ASSESSMENT:    · Cellulitis with abscess right middle finger status post incision and drainage  · Wound culture positive for  MSSA  · Systemic inflammatory response syndrome    PLAN:    · Continue Ancef 2 g IV every 8 hours  · Ortho to reevaluate  · Not ready for discharge  · Monitor labs  · Will follow with you         Malik Flores    11:26 AM  2/13/2021

## 2021-02-14 LAB
ALBUMIN SERPL-MCNC: 3.6 G/DL (ref 3.5–5.2)
ALP BLD-CCNC: 60 U/L (ref 35–104)
ALT SERPL-CCNC: <5 U/L (ref 0–32)
ANION GAP SERPL CALCULATED.3IONS-SCNC: 8 MMOL/L (ref 7–16)
AST SERPL-CCNC: 14 U/L (ref 0–31)
BASOPHILS ABSOLUTE: 0.08 E9/L (ref 0–0.2)
BASOPHILS RELATIVE PERCENT: 0.9 % (ref 0–2)
BILIRUB SERPL-MCNC: 0.2 MG/DL (ref 0–1.2)
BUN BLDV-MCNC: 13 MG/DL (ref 6–20)
CALCIUM SERPL-MCNC: 9 MG/DL (ref 8.6–10.2)
CHLORIDE BLD-SCNC: 103 MMOL/L (ref 98–107)
CO2: 26 MMOL/L (ref 22–29)
CREAT SERPL-MCNC: 0.7 MG/DL (ref 0.5–1)
EOSINOPHILS ABSOLUTE: 0.45 E9/L (ref 0.05–0.5)
EOSINOPHILS RELATIVE PERCENT: 5.1 % (ref 0–6)
GFR AFRICAN AMERICAN: >60
GFR NON-AFRICAN AMERICAN: >60 ML/MIN/1.73
GLUCOSE BLD-MCNC: 107 MG/DL (ref 74–99)
HCT VFR BLD CALC: 37.5 % (ref 34–48)
HEMOGLOBIN: 11.8 G/DL (ref 11.5–15.5)
IMMATURE GRANULOCYTES #: 0.04 E9/L
IMMATURE GRANULOCYTES %: 0.5 % (ref 0–5)
LYMPHOCYTES ABSOLUTE: 2.84 E9/L (ref 1.5–4)
LYMPHOCYTES RELATIVE PERCENT: 32.1 % (ref 20–42)
MCH RBC QN AUTO: 27.6 PG (ref 26–35)
MCHC RBC AUTO-ENTMCNC: 31.5 % (ref 32–34.5)
MCV RBC AUTO: 87.8 FL (ref 80–99.9)
MONOCYTES ABSOLUTE: 0.81 E9/L (ref 0.1–0.95)
MONOCYTES RELATIVE PERCENT: 9.2 % (ref 2–12)
NEUTROPHILS ABSOLUTE: 4.63 E9/L (ref 1.8–7.3)
NEUTROPHILS RELATIVE PERCENT: 52.2 % (ref 43–80)
PDW BLD-RTO: 13.3 FL (ref 11.5–15)
PLATELET # BLD: 305 E9/L (ref 130–450)
PMV BLD AUTO: 10.6 FL (ref 7–12)
POTASSIUM REFLEX MAGNESIUM: 3.8 MMOL/L (ref 3.5–5)
RBC # BLD: 4.27 E12/L (ref 3.5–5.5)
SODIUM BLD-SCNC: 137 MMOL/L (ref 132–146)
TOTAL PROTEIN: 6.3 G/DL (ref 6.4–8.3)
WBC # BLD: 8.9 E9/L (ref 4.5–11.5)

## 2021-02-14 PROCEDURE — 85025 COMPLETE CBC W/AUTO DIFF WBC: CPT

## 2021-02-14 PROCEDURE — 76937 US GUIDE VASCULAR ACCESS: CPT

## 2021-02-14 PROCEDURE — 80053 COMPREHEN METABOLIC PANEL: CPT

## 2021-02-14 PROCEDURE — 02HV33Z INSERTION OF INFUSION DEVICE INTO SUPERIOR VENA CAVA, PERCUTANEOUS APPROACH: ICD-10-PCS | Performed by: RADIOLOGY

## 2021-02-14 PROCEDURE — 6370000000 HC RX 637 (ALT 250 FOR IP): Performed by: INTERNAL MEDICINE

## 2021-02-14 PROCEDURE — C1751 CATH, INF, PER/CENT/MIDLINE: HCPCS

## 2021-02-14 PROCEDURE — 36569 INSJ PICC 5 YR+ W/O IMAGING: CPT

## 2021-02-14 PROCEDURE — 36415 COLL VENOUS BLD VENIPUNCTURE: CPT

## 2021-02-14 PROCEDURE — 2580000003 HC RX 258: Performed by: INTERNAL MEDICINE

## 2021-02-14 PROCEDURE — 6360000002 HC RX W HCPCS: Performed by: INTERNAL MEDICINE

## 2021-02-14 PROCEDURE — 1200000000 HC SEMI PRIVATE

## 2021-02-14 PROCEDURE — 2500000003 HC RX 250 WO HCPCS: Performed by: INTERNAL MEDICINE

## 2021-02-14 RX ORDER — HEPARIN SODIUM (PORCINE) LOCK FLUSH IV SOLN 100 UNIT/ML 100 UNIT/ML
3 SOLUTION INTRAVENOUS EVERY 12 HOURS SCHEDULED
Status: DISCONTINUED | OUTPATIENT
Start: 2021-02-14 | End: 2021-02-15 | Stop reason: HOSPADM

## 2021-02-14 RX ORDER — HYDROCHLOROTHIAZIDE 12.5 MG/1
12.5 TABLET ORAL EVERY MORNING
Status: DISCONTINUED | OUTPATIENT
Start: 2021-02-14 | End: 2021-02-15 | Stop reason: HOSPADM

## 2021-02-14 RX ORDER — LIDOCAINE HYDROCHLORIDE 10 MG/ML
5 INJECTION, SOLUTION EPIDURAL; INFILTRATION; INTRACAUDAL; PERINEURAL ONCE
Status: COMPLETED | OUTPATIENT
Start: 2021-02-14 | End: 2021-02-14

## 2021-02-14 RX ORDER — SODIUM CHLORIDE 0.9 % (FLUSH) 0.9 %
10 SYRINGE (ML) INJECTION PRN
Status: DISCONTINUED | OUTPATIENT
Start: 2021-02-14 | End: 2021-02-15 | Stop reason: HOSPADM

## 2021-02-14 RX ORDER — HEPARIN SODIUM (PORCINE) LOCK FLUSH IV SOLN 100 UNIT/ML 100 UNIT/ML
3 SOLUTION INTRAVENOUS PRN
Status: DISCONTINUED | OUTPATIENT
Start: 2021-02-14 | End: 2021-02-15 | Stop reason: HOSPADM

## 2021-02-14 RX ADMIN — Medication 2000 MG: at 02:17

## 2021-02-14 RX ADMIN — HYDROCODONE BITARTRATE AND ACETAMINOPHEN 1 TABLET: 5; 325 TABLET ORAL at 12:03

## 2021-02-14 RX ADMIN — SODIUM CHLORIDE, PRESERVATIVE FREE 10 ML: 5 INJECTION INTRAVENOUS at 11:45

## 2021-02-14 RX ADMIN — MAGNESIUM 64 MG (MAGNESIUM CHLORIDE) TABLET,DELAYED RELEASE 64 MG: at 08:34

## 2021-02-14 RX ADMIN — LIDOCAINE HYDROCHLORIDE 0.5 ML: 10 INJECTION, SOLUTION EPIDURAL; INFILTRATION; INTRACAUDAL; PERINEURAL at 14:16

## 2021-02-14 RX ADMIN — SODIUM CHLORIDE, PRESERVATIVE FREE 300 UNITS: 5 INJECTION INTRAVENOUS at 19:50

## 2021-02-14 RX ADMIN — Medication: at 18:14

## 2021-02-14 RX ADMIN — Medication 10 ML: at 19:47

## 2021-02-14 RX ADMIN — Medication 10 ML: at 08:35

## 2021-02-14 RX ADMIN — Medication 2000 MG: at 11:45

## 2021-02-14 RX ADMIN — TOPIRAMATE 25 MG: 25 TABLET, FILM COATED ORAL at 05:57

## 2021-02-14 RX ADMIN — HYDROCODONE BITARTRATE AND ACETAMINOPHEN 1 TABLET: 5; 325 TABLET ORAL at 16:58

## 2021-02-14 RX ADMIN — Medication 2000 MG: at 19:46

## 2021-02-14 RX ADMIN — HYDROCODONE BITARTRATE AND ACETAMINOPHEN 1 TABLET: 5; 325 TABLET ORAL at 02:18

## 2021-02-14 RX ADMIN — ASPIRIN 81 MG: 81 TABLET, COATED ORAL at 08:34

## 2021-02-14 RX ADMIN — HYDROCHLOROTHIAZIDE 12.5 MG: 12.5 TABLET ORAL at 11:45

## 2021-02-14 RX ADMIN — CHOLECALCIFEROL TAB 10 MCG (400 UNIT) 800 UNITS: 10 TAB at 08:34

## 2021-02-14 ASSESSMENT — PAIN DESCRIPTION - LOCATION: LOCATION: FINGER (COMMENT WHICH ONE)

## 2021-02-14 ASSESSMENT — PAIN DESCRIPTION - FREQUENCY: FREQUENCY: INTERMITTENT

## 2021-02-14 ASSESSMENT — PAIN SCALES - GENERAL
PAINLEVEL_OUTOF10: 6
PAINLEVEL_OUTOF10: 9
PAINLEVEL_OUTOF10: 2
PAINLEVEL_OUTOF10: 0

## 2021-02-14 ASSESSMENT — PAIN DESCRIPTION - PAIN TYPE: TYPE: ACUTE PAIN

## 2021-02-14 ASSESSMENT — PAIN DESCRIPTION - PROGRESSION: CLINICAL_PROGRESSION: NOT CHANGED

## 2021-02-14 ASSESSMENT — PAIN DESCRIPTION - DESCRIPTORS: DESCRIPTORS: ACHING;THROBBING;DULL

## 2021-02-14 NOTE — PLAN OF CARE
Problem: Pain:  Goal: Pain level will decrease  Description: Pain level will decrease  2/13/2021 2041 by Elina Davis RN  Outcome: Ongoing  2/13/2021 1249 by Marcy Farias RN  Outcome: Met This Shift  Goal: Control of acute pain  Description: Control of acute pain  2/13/2021 2041 by Elina Davis RN  Outcome: Ongoing  2/13/2021 1249 by Marcy Farias RN  Outcome: Met This Shift  Goal: Control of chronic pain  Description: Control of chronic pain  2/13/2021 2041 by Elina Davis RN  Outcome: Ongoing  2/13/2021 1249 by Marcy Farias RN  Outcome: Met This Shift     Problem: Skin Integrity:  Goal: Will show no infection signs and symptoms  Description: Will show no infection signs and symptoms  2/13/2021 2041 by Elina Davis RN  Outcome: Ongoing  2/13/2021 1249 by Marcy Farias RN  Outcome: Met This Shift  Goal: Absence of new skin breakdown  Description: Absence of new skin breakdown  2/13/2021 2041 by Elina Davis RN  Outcome: Ongoing  2/13/2021 1249 by Marcy Farias RN  Outcome: Met This Shift     Problem: Falls - Risk of:  Goal: Will remain free from falls  Description: Will remain free from falls  2/13/2021 2041 by Elina Davis RN  Outcome: Ongoing  2/13/2021 1249 by Marcy Farias RN  Outcome: Met This Shift  Goal: Absence of physical injury  Description: Absence of physical injury  2/13/2021 2041 by Elina Davis RN  Outcome: Ongoing  2/13/2021 1249 by Marcy Farias RN  Outcome: Met This Shift

## 2021-02-14 NOTE — PROGRESS NOTES
[address] [place] [address] [facility]  Progress Note      Chief Complaint   Patient presents with    Wound Check     right hand, started on friday a small white pimple, had someone jennifer it yesterday, now has swelling, chills and fever    Fever       SUBJECTIVE:      Patient is tolerating medications. No reported adverse drug reactions. No problems overnight. Hand feels better today. Review of systems:    As stated above in the chief complaint, otherwise negative. Medications:    Scheduled Meds:   ceFAZolin  2,000 mg Intravenous Q8H    amLODIPine  5 mg Oral QAM    aspirin  81 mg Oral QAM    escitalopram  10 mg Oral QAM    magnesium chloride  64 mg Oral QAM    topiramate  25 mg Oral QAM AC    vitamin D3  800 Units Oral QAM    sodium chloride flush  10 mL Intravenous 2 times per day    enoxaparin  40 mg Subcutaneous Daily     Continuous Infusions:  PRN Meds:povidone-iodine, HYDROcodone 5 mg - acetaminophen, acetaminophen, sodium chloride flush, promethazine **OR** ondansetron, polyethylene glycol  Prior to Admission medications    Medication Sig Start Date End Date Taking? Authorizing Provider   HYDROcodone-acetaminophen (NORCO) 5-325 MG per tablet Take 1 tablet by mouth every 6 hours as needed for Pain.  Last dose 2/10/21 [email or web address]   Yes Historical Provider, MD   sulfamethoxazole-trimethoprim (BACTRIM DS;SEPTRA DS) 800-160 MG per tablet Take 1 tablet by mouth 2 times daily   Yes Historical Provider, MD   topiramate (TOPAMAX) 25 MG tablet Take 25 mg by mouth every morning (before breakfast)   Yes Historical Provider, MD   acetaminophen (TYLENOL) 500 MG tablet Take 1,000 mg by mouth every 6 hours as needed for Pain   Yes Historical Provider, MD   hydrochlorothiazide (HYDRODIURIL) 12.5 MG tablet Take 12.5 mg by mouth every morning  4/10/18  Yes Historical Provider, MD   aspirin 81 MG EC tablet Take 1 tablet by mouth 2 times daily  Patient taking differently: Take 81 mg by mouth every morning  18  Yes Tony Trevizo MD   vitamin D3 (CHOLECALCIFEROL) 400 units TABS tablet Take 800 Units by mouth every morning    Yes Historical Provider, MD   Magnesium 100 MG CAPS Take 1 capsule by mouth every morning    Yes Historical Provider, MD       OBJECTIVE:  /70   Pulse 72   Temp 98 °F (36.7 °C) (Oral)   Resp 16   Ht 5' 8\" (1.727 m)   Wt (!) 302 lb (137 kg)   LMP 2018   SpO2 98%   BMI 45.92 kg/m²   Temp  Av.2 °F (36.8 °C)  Min: 98 °F (36.7 °C)  Max: 98.4 °F (36.9 °C)  General appearance: Resting in bed in no apparent distress. Skin: Warm and dry. No rashes were noted. HEENT: Round and reactive pupils. Moist mucous membranes. No ulcerations or thrush. Neck: Supple to movements. Chest: No use of accessory muscles to breathe. Symmetrical expansion. No wheezing, crackles or rhonchi. Cardiovascular: Regular rate and rhythm. No murmurs gallops, or rubs appreciated. Abdomen: Bowel sounds present, nontender, nondistended, no masses or hepatosplenomegaly. Extremities: Less erythema, less swelling right middle finger  Lines: peripheral  Neuro: No significant sensory or motor deficits noted    I/O last 3 completed shifts:   In: 480 [P.O.:480]  Out: -       Laboratory and Tests Review:      Lab Results   Component Value Date    WBC 8.9 2021    WBC 10.3 2021    WBC 9.0 2021    HGB 11.8 2021    HCT 37.5 2021    MCV 87.8 2021     2021     Lab Results   Component Value Date    NEUTROABS 4.63 2021    NEUTROABS 5.69 2021    NEUTROABS 4.92 2021     Lab Results   Component Value Date    CRPHS 6.2 (H) 2019     Lab Results   Component Value Date    ALT <5 2021    AST 14 2021    ALKPHOS 60 2021    BILITOT 0.2 2021     Lab Results   Component Value Date     2021    K 3.8 2021     2021    CO2 26 2021    BUN 13 2021    CREATININE 0.7 2021 CREATININE 0.8 02/13/2021    CREATININE 0.8 02/12/2021    GFRAA >60 02/14/2021    LABGLOM >60 02/14/2021    GLUCOSE 107 02/14/2021    PROT 6.3 02/14/2021    LABALBU 3.6 02/14/2021    CALCIUM 9.0 02/14/2021    BILITOT 0.2 02/14/2021    ALKPHOS 60 02/14/2021    AST 14 02/14/2021    ALT <5 02/14/2021     Lab Results   Component Value Date    CRP 0.7 (H) 02/10/2021     Lab Results   Component Value Date    SEDRATE 20 02/10/2021    SEDRATE 25 (H) 04/07/2018       Radiology:    XR HAND RIGHT (MIN 3 VIEWS)   Final Result   No acute osseous abnormality. Diffuse soft tissue swelling. Please consider MR or triple phase bone scan and if there is clinical   suspicion for osteomyelitis. Microbiology:   Lab Results   Component Value Date    BC 24 Hours no growth 02/10/2021    ORG Staphylococcus aureus 02/10/2021     Lab Results   Component Value Date    BLOODCULT2 24 Hours no growth 02/10/2021    ORG Staphylococcus aureus 02/10/2021     WOUND/ABSCESS   Date Value Ref Range Status   02/10/2021 Moderate growth  Final     No results found for: RESPSMEAR  No results found for: MPNEUMO, CLAMYDCU, LABLEGI, AFBCX, FUNGSM, LABFUNG  No results found for: CULTRESP  No results found for: CXCATHTIP  No results found for: BFCS  No results found for: CXSURG  No results found for: LABURIN  No results found for: [facility]    Problem list:    Active Problems:    Cellulitis and abscess of upper extremity  Resolved Problems:    * No resolved hospital problems.  *      ASSESSMENT:    · Cellulitis with abscess right middle finger status post incision and drainage  · Wound culture positive for  MSSA  · Systemic inflammatory response syndrome    PLAN:       · Continue Ancef 2 g IV every 8 hours-antibiotics reconciled  · Discussed with orthopedics yesterday  · Place PICC line  · Will need office follow-up in 2 weeks  · Monitor labs  · Will follow with you      Informed Consent for PICC:  I explained the risks, benefits, alternative options, and potential complications associated with insertion of Peripherally Inserted Central Catheter (PICC) with the patient prior to the procedure.     Electronically signed by Ne Hirsch DO on 2/14/2021 at 10:25 AM    Ne Culver    10:23 AM  2/14/2021

## 2021-02-14 NOTE — PROGRESS NOTES
Subjective: The patient is awake and alert. No problems overnight. Denies chest pain, angina, and dyspnea. Denies abdominal pain. Tolerating diet. No nausea or vomiting. Objective:    /76   Pulse 67   Temp 98.4 °F (36.9 °C) (Oral)   Resp 16   Ht 5' 8\" (1.727 m)   Wt (!) 302 lb (137 kg)   LMP 02/13/2018   SpO2 99%   BMI 45.92 kg/m²     Heart:  RRR, no murmurs, gallops, or rubs.   Lungs:  CTA bilaterally, no wheeze, rales or rhonchi  Abd: bowel sounds present, nontender, nondistended, no masses  Extrem:  No clubbing, cyanosis, or edema    CBC with Differential:    Lab Results   Component Value Date    WBC 8.9 02/14/2021    RBC 4.27 02/14/2021    HGB 11.8 02/14/2021    HCT 37.5 02/14/2021     02/14/2021    MCV 87.8 02/14/2021    MCH 27.6 02/14/2021    MCHC 31.5 02/14/2021    RDW 13.3 02/14/2021    LYMPHOPCT 32.1 02/14/2021    MONOPCT 9.2 02/14/2021    BASOPCT 0.9 02/14/2021    MONOSABS 0.81 02/14/2021    LYMPHSABS 2.84 02/14/2021    EOSABS 0.45 02/14/2021    BASOSABS 0.08 02/14/2021     CMP:    Lab Results   Component Value Date     02/14/2021    K 3.8 02/14/2021     02/14/2021    CO2 26 02/14/2021    BUN 13 02/14/2021    CREATININE 0.7 02/14/2021    GFRAA >60 02/14/2021    LABGLOM >60 02/14/2021    GLUCOSE 107 02/14/2021    PROT 6.3 02/14/2021    LABALBU 3.6 02/14/2021    CALCIUM 9.0 02/14/2021    BILITOT 0.2 02/14/2021    ALKPHOS 60 02/14/2021    AST 14 02/14/2021    ALT <5 02/14/2021     PT/INR:    Lab Results   Component Value Date    PROTIME 12.4 05/21/2019    INR 1.1 05/21/2019     @cktotal:3,ckmb:3,ckmbindex:3,troponini:3@  U/A:  No results found for: Yi Presume, PHUR, LABCAST, WBCUA, RBCUA, MUCUS, TRICHOMONAS, YEAST, BACTERIA, CLARITYU, SPECGRAV, UROBILINOGEN, BILIRUBINUR, BLOODU, GLUCOSEU, KETUA, AMORPHOUS     Assessment:    Patient Active Problem List   Diagnosis    Morbid obesity with BMI of 40.0-44.9, adult ([facility] [address]    Essential hypertension    Hyperlipidemia LDL goal <100    Altered mental status    Cellulitis and abscess of upper extremity       Plan:  cellulitis and abscess-  ABT per ID  Looks better this am  Likely home in am with PICC          Juanita Gomez DO  6:51 AM  2/14/2021

## 2021-02-14 NOTE — PROGRESS NOTES
Department of Orthopedic Surgery  Joint Service  Attending Progress Note        Subjective:  Doing well,  Feeling much better. Feels like hand and finger are doing better than yesterday. Did soaks this morning already    Vitals  VITALS:  /70   Pulse 72   Temp 98 °F (36.7 °C) (Oral)   Resp 16   Ht 5' 8\" (1.727 m)   Wt (!) 302 lb (137 kg)   LMP 02/13/2018   SpO2 98%   BMI 45.92 kg/m²   24HR INTAKE/OUTPUT:    Intake/Output Summary (Last 24 hours) at 2/14/2021 1005  Last data filed at 2/14/2021 0514  Gross per 24 hour   Intake 360 ml   Output --   Net 360 ml       PHYSICAL EXAM:    Orientation:  alert and oriented to person, place and time    Right Upper Extremity    Incision:  dressing in place, clean, dry and intact   Wound less erythematous, no purulent drainage, Less swelling, no pain with passive flexion and extension of finger   good cap refill   compartments soft    LABS:    HgB:    Lab Results   Component Value Date    HGB 11.8 02/14/2021     INR:  No results found for: PTINR  CBC:   Lab Results   Component Value Date    WBC 8.9 02/14/2021    RBC 4.27 02/14/2021    HGB 11.8 02/14/2021    HCT 37.5 02/14/2021    MCV 87.8 02/14/2021    MCH 27.6 02/14/2021    MCHC 31.5 02/14/2021    RDW 13.3 02/14/2021     02/14/2021    MPV 10.6 02/14/2021     BMP:    Lab Results   Component Value Date     02/14/2021    K 3.8 02/14/2021     02/14/2021    CO2 26 02/14/2021    BUN 13 02/14/2021    LABALBU 3.6 02/14/2021    CREATININE 0.7 02/14/2021    CALCIUM 9.0 02/14/2021    GFRAA >60 02/14/2021    LABGLOM >60 02/14/2021    GLUCOSE 107 02/14/2021       ASSESSMENT AND PLAN:    Right hand long finger dorsal proximal phalanx soft tissue abscess with MSSA- improving. Clinically continuing to improve. Patient feels better. WBC count improving. Afeb. Plan for IV ABX, picc line and home tomorrow. Patient happy with and in agreement with this plan. All questions were answered.     Jennifer Montiel Stefancin

## 2021-02-14 NOTE — PROCEDURES
PICC    Catheter insertion date 2/14/2021     Product Number:  Ref [facility] [id number]   Lot No: [id number]   Gauge: 18   Lumen: single, 4FR   Lt basilic vein    Vein Diameter : 0.48cm   Upper arm circumference (10CM ABOVE AC): 43cm   Catheter Length : 50cm   Internal Length: 48cm   External Catheter Length: 2cm   Ultrasound Used:yes  VPS [employer] [place] confirms PICC tip is placed in the lower 1/3 of the SVC or at the Cavoatrial junction. Floor nurse notified PICC is okay to use.    : MARK Bradley-BC

## 2021-02-15 VITALS
RESPIRATION RATE: 16 BRPM | HEART RATE: 78 BPM | WEIGHT: 293 LBS | DIASTOLIC BLOOD PRESSURE: 76 MMHG | SYSTOLIC BLOOD PRESSURE: 139 MMHG | TEMPERATURE: 97.8 F | BODY MASS INDEX: 44.41 KG/M2 | OXYGEN SATURATION: 98 % | HEIGHT: 68 IN

## 2021-02-15 LAB
ALBUMIN SERPL-MCNC: 3.8 G/DL (ref 3.5–5.2)
ALP BLD-CCNC: 61 U/L (ref 35–104)
ALT SERPL-CCNC: 5 U/L (ref 0–32)
ANION GAP SERPL CALCULATED.3IONS-SCNC: 6 MMOL/L (ref 7–16)
AST SERPL-CCNC: 13 U/L (ref 0–31)
BASOPHILS ABSOLUTE: 0.07 E9/L (ref 0–0.2)
BASOPHILS RELATIVE PERCENT: 0.8 % (ref 0–2)
BILIRUB SERPL-MCNC: 0.2 MG/DL (ref 0–1.2)
BLOOD CULTURE, ROUTINE: NORMAL
BUN BLDV-MCNC: 13 MG/DL (ref 6–20)
CALCIUM SERPL-MCNC: 8.9 MG/DL (ref 8.6–10.2)
CHLORIDE BLD-SCNC: 104 MMOL/L (ref 98–107)
CO2: 28 MMOL/L (ref 22–29)
CREAT SERPL-MCNC: 0.7 MG/DL (ref 0.5–1)
CULTURE, BLOOD 2: NORMAL
EOSINOPHILS ABSOLUTE: 0.39 E9/L (ref 0.05–0.5)
EOSINOPHILS RELATIVE PERCENT: 4.6 % (ref 0–6)
GFR AFRICAN AMERICAN: >60
GFR NON-AFRICAN AMERICAN: >60 ML/MIN/1.73
GLUCOSE BLD-MCNC: 99 MG/DL (ref 74–99)
HCT VFR BLD CALC: 38.1 % (ref 34–48)
HEMOGLOBIN: 11.8 G/DL (ref 11.5–15.5)
IMMATURE GRANULOCYTES #: 0.03 E9/L
IMMATURE GRANULOCYTES %: 0.4 % (ref 0–5)
LYMPHOCYTES ABSOLUTE: 2.9 E9/L (ref 1.5–4)
LYMPHOCYTES RELATIVE PERCENT: 34.5 % (ref 20–42)
MCH RBC QN AUTO: 27.4 PG (ref 26–35)
MCHC RBC AUTO-ENTMCNC: 31 % (ref 32–34.5)
MCV RBC AUTO: 88.4 FL (ref 80–99.9)
MONOCYTES ABSOLUTE: 0.81 E9/L (ref 0.1–0.95)
MONOCYTES RELATIVE PERCENT: 9.6 % (ref 2–12)
NEUTROPHILS ABSOLUTE: 4.2 E9/L (ref 1.8–7.3)
NEUTROPHILS RELATIVE PERCENT: 50.1 % (ref 43–80)
PDW BLD-RTO: 13 FL (ref 11.5–15)
PLATELET # BLD: 295 E9/L (ref 130–450)
PMV BLD AUTO: 10.4 FL (ref 7–12)
POTASSIUM REFLEX MAGNESIUM: 4 MMOL/L (ref 3.5–5)
RBC # BLD: 4.31 E12/L (ref 3.5–5.5)
SODIUM BLD-SCNC: 138 MMOL/L (ref 132–146)
TOTAL PROTEIN: 6.3 G/DL (ref 6.4–8.3)
WBC # BLD: 8.4 E9/L (ref 4.5–11.5)

## 2021-02-15 PROCEDURE — 6370000000 HC RX 637 (ALT 250 FOR IP): Performed by: INTERNAL MEDICINE

## 2021-02-15 PROCEDURE — 36415 COLL VENOUS BLD VENIPUNCTURE: CPT

## 2021-02-15 PROCEDURE — 85025 COMPLETE CBC W/AUTO DIFF WBC: CPT

## 2021-02-15 PROCEDURE — 80053 COMPREHEN METABOLIC PANEL: CPT

## 2021-02-15 PROCEDURE — 2580000003 HC RX 258: Performed by: INTERNAL MEDICINE

## 2021-02-15 PROCEDURE — 6360000002 HC RX W HCPCS: Performed by: INTERNAL MEDICINE

## 2021-02-15 RX ADMIN — HYDROCODONE BITARTRATE AND ACETAMINOPHEN 1 TABLET: 5; 325 TABLET ORAL at 09:02

## 2021-02-15 RX ADMIN — TOPIRAMATE 25 MG: 25 TABLET, FILM COATED ORAL at 06:15

## 2021-02-15 RX ADMIN — HYDROCODONE BITARTRATE AND ACETAMINOPHEN 1 TABLET: 5; 325 TABLET ORAL at 02:54

## 2021-02-15 RX ADMIN — Medication 2000 MG: at 03:53

## 2021-02-15 RX ADMIN — HYDROCHLOROTHIAZIDE 12.5 MG: 12.5 TABLET ORAL at 08:57

## 2021-02-15 RX ADMIN — Medication 10 ML: at 08:58

## 2021-02-15 RX ADMIN — Medication 2000 MG: at 11:28

## 2021-02-15 RX ADMIN — MAGNESIUM 64 MG (MAGNESIUM CHLORIDE) TABLET,DELAYED RELEASE 64 MG: at 08:57

## 2021-02-15 RX ADMIN — SODIUM CHLORIDE, PRESERVATIVE FREE 300 UNITS: 5 INJECTION INTRAVENOUS at 08:58

## 2021-02-15 RX ADMIN — ASPIRIN 81 MG: 81 TABLET, COATED ORAL at 08:57

## 2021-02-15 RX ADMIN — CHOLECALCIFEROL TAB 10 MCG (400 UNIT) 800 UNITS: 10 TAB at 08:57

## 2021-02-15 ASSESSMENT — PAIN SCALES - GENERAL
PAINLEVEL_OUTOF10: 8
PAINLEVEL_OUTOF10: 5

## 2021-02-15 NOTE — PROGRESS NOTES
Pt seen. Wound much improved. Lab ok  PICC line in LUE. OK for d/c today once atb set up at home. See me 4-7 days. Off work until office f/u.   See d/c summary for full note

## 2021-02-15 NOTE — CARE COORDINATION
Script for IV atb sent via fax to [facility] [phone]. Call placed to agency, they will review staffing and call back regarding capacity to deliver tonight's dose, due at [address] [place] [address]. Will follow along with  and assist with discharge planning as necessary. [address]  Call from Alice Patel at [facility]. Confirmed plan for 1145 dose of atb to be given here with agency planning to open in home tonight for provision of 1945 dose. Neida Dunlap.  Vijay, MSN, RN  [facility] Case Management  [phone]

## 2021-02-15 NOTE — PATIENT CARE CONFERENCE
P Quality Flow/Interdisciplinary Rounds Progress Note        Quality Flow Rounds held on February 15, 2021    Disciplines Attending:  Bedside Nurse, ,  and Nursing Unit Leadership    Rick Shen was admitted on 2/10/2021  8:35 PM    Anticipated Discharge Date:  Expected Discharge Date: 02/12/21    Disposition:    Theo Score:  Theo Scale Score: 22    Readmission Risk              Risk of Unplanned Readmission:        8           Discussed patient goal for the day, patient clinical progression, and barriers to discharge.   The following Goal(s) of the Day/Commitment(s) have been identified:  Discharge - Obtain Order      Umer Verduzco  February 15, 2021

## 2021-02-15 NOTE — PROGRESS NOTES
Department of Orthopedic Surgery  Joint Service  Attending Progress Note        Subjective:  Talked with patient this morning by phone. Feeling even better. Patient reports finger looks even better this morning than yesterday. Already did her betadine soak this morning. PICC line not bothering her. Vitals  VITALS:  /75   Pulse 82   Temp 98.2 °F (36.8 °C) (Oral)   Resp 16   Ht 5' 8\" (1.727 m)   Wt 297 lb (134.7 kg)   LMP 02/13/2018   SpO2 98%   BMI 45.16 kg/m²   24HR INTAKE/OUTPUT:    Intake/Output Summary (Last 24 hours) at 2/15/2021 0703  Last data filed at 2/15/2021 0531  Gross per 24 hour   Intake 960 ml   Output --   Net 960 ml       PHYSICAL EXAM:    Orientation:  alert and oriented to person, place and time per our phone conversation    LABS:    HgB:    Lab Results   Component Value Date    HGB 11.8 02/15/2021     INR:  No results found for: PTINR  CBC:   Lab Results   Component Value Date    WBC 8.4 02/15/2021    RBC 4.31 02/15/2021    HGB 11.8 02/15/2021    HCT 38.1 02/15/2021    MCV 88.4 02/15/2021    MCH 27.4 02/15/2021    MCHC 31.0 02/15/2021    RDW 13.0 02/15/2021     02/15/2021    MPV 10.4 02/15/2021     BMP:    Lab Results   Component Value Date     02/15/2021    K 4.0 02/15/2021     02/15/2021    CO2 28 02/15/2021    BUN 13 02/15/2021    LABALBU 3.8 02/15/2021    CREATININE 0.7 02/15/2021    CALCIUM 8.9 02/15/2021    GFRAA >60 02/15/2021    LABGLOM >60 02/15/2021    GLUCOSE 99 02/15/2021       ASSESSMENT AND PLAN:     Right hand long finger proximal phalanx soft tissue abscess with MSSA    Improving and doing well. Afeb    PICC in place    Plan for discharge home today. I am fine with this. Will discontinue soaks at this point at home    I discussed discharge instructions with patient and she has my cell phone number to keep in contact with me at any time.     Will see her after surgery today if she is still in house, but does not need to wait for me if ready for discharge.       Cassie Parr

## 2021-02-15 NOTE — PLAN OF CARE
Problem: Pain:  Goal: Pain level will decrease  Description: Pain level will decrease  2/14/2021 2026 by Annel Lamb RN  Outcome: Ongoing  2/14/2021 1236 by Mariangel Shukla RN  Outcome: Met This Shift  Goal: Control of acute pain  Description: Control of acute pain  2/14/2021 2026 by Annel Lamb RN  Outcome: Ongoing  2/14/2021 1236 by Mariangel Shukla RN  Outcome: Met This Shift  Goal: Control of chronic pain  Description: Control of chronic pain  2/14/2021 2026 by Annel Lamb RN  Outcome: Ongoing  2/14/2021 1236 by Mariangel Shukla RN  Outcome: Met This Shift     Problem: Skin Integrity:  Goal: Will show no infection signs and symptoms  Description: Will show no infection signs and symptoms  2/14/2021 2026 by Annel Lamb RN  Outcome: Ongoing  2/14/2021 1236 by Mariangel Shukla RN  Outcome: Met This Shift  Goal: Absence of new skin breakdown  Description: Absence of new skin breakdown  2/14/2021 2026 by Annel Lamb RN  Outcome: Ongoing  2/14/2021 1236 by Mariangel Shukla RN  Outcome: Met This Shift     Problem: Falls - Risk of:  Goal: Will remain free from falls  Description: Will remain free from falls  2/14/2021 2026 by Annel Lamb RN  Outcome: Ongoing  2/14/2021 1236 by Mariangel Shukla RN  Outcome: Met This Shift  Goal: Absence of physical injury  Description: Absence of physical injury  2/14/2021 2026 by Annel Lamb RN  Outcome: Ongoing  2/14/2021 1236 by Mariangel Shukla RN  Outcome: Met This Shift

## 2021-02-15 NOTE — PROGRESS NOTES
[address] [place] [address] [facility]  Progress Note      Chief Complaint   Patient presents with    Wound Check     right hand, started on friday a small white pimple, had someone jennifer it yesterday, now has swelling, chills and fever    Fever       SUBJECTIVE:      Patient is tolerating medications. No reported adverse drug reactions. No problems overnight. Family feels much better although still a little stiff. Review of systems:    As stated above in the chief complaint, otherwise negative. Medications:    Scheduled Meds:   hydroCHLOROthiazide  12.5 mg Oral QAM    heparin flush  3 mL Intravenous 2 times per day    ceFAZolin  2,000 mg Intravenous Q8H    aspirin  81 mg Oral QAM    magnesium chloride  64 mg Oral QAM    topiramate  25 mg Oral QAM AC    vitamin D3  800 Units Oral QAM    sodium chloride flush  10 mL Intravenous 2 times per day    enoxaparin  40 mg Subcutaneous Daily     Continuous Infusions:  PRN Meds:sodium chloride flush, heparin flush, povidone-iodine, HYDROcodone 5 mg - acetaminophen, acetaminophen, sodium chloride flush, promethazine **OR** ondansetron, polyethylene glycol  Prior to Admission medications    Medication Sig Start Date End Date Taking? Authorizing Provider   ceFAZolin (ANCEF) infusion Infuse 2,000 mg intravenously every 8 hours for 10 days Compound per protocol 2/14/21 2/24/21 Yes Ciaran Tay DO   HYDROcodone-acetaminophen (NORCO) 5-325 MG per tablet Take 1 tablet by mouth every 6 hours as needed for Pain.  Last dose 2/10/21 [email or web address]   Yes Historical Provider, MD   topiramate (TOPAMAX) 25 MG tablet Take 25 mg by mouth every morning (before breakfast)   Yes Historical Provider, MD   acetaminophen (TYLENOL) 500 MG tablet Take 1,000 mg by mouth every 6 hours as needed for Pain   Yes Historical Provider, MD   hydrochlorothiazide (HYDRODIURIL) 12.5 MG tablet Take 12.5 mg by mouth every morning  4/10/18  Yes Historical Provider, MD   aspirin 81 MG EC tablet Take 1 tablet by mouth 2 times daily  Patient taking differently: Take 81 mg by mouth every morning  18  Yes Marika Mendez MD   vitamin D3 (CHOLECALCIFEROL) 400 units TABS tablet Take 800 Units by mouth every morning    Yes Historical Provider, MD   Magnesium 100 MG CAPS Take 1 capsule by mouth every morning    Yes Historical Provider, MD       OBJECTIVE:  /76   Pulse 78   Temp 97.8 °F (36.6 °C) (Oral)   Resp 16   Ht 5' 8\" (1.727 m)   Wt 297 lb (134.7 kg)   LMP 2018   SpO2 98%   BMI 45.16 kg/m²   Temp  Av °F (36.7 °C)  Min: 97.8 °F (36.6 °C)  Max: 98.2 °F (36.8 °C)  General appearance: Resting in bed in no apparent distress. Skin: Warm and dry. No rashes were noted. HEENT: Round and reactive pupils. Moist mucous membranes. No ulcerations or thrush. Neck: Supple to movements. Chest: No use of accessory muscles to breathe. Symmetrical expansion. No wheezing, crackles or rhonchi. Cardiovascular: Regular rate and rhythm. No murmurs gallops, or rubs appreciated. Abdomen: Bowel sounds present, nontender, nondistended, no masses or hepatosplenomegaly. Extremities: Wound right middle finger less erythematous, less swollen. No clubbing, no cyanosis, no edema. Lines: PICC line left arm  Neuro: No significant sensory or motor deficits noted    I/O last 3 completed shifts:   In: 12 [P.O.:960]  Out: -       Laboratory and Tests Review:      Lab Results   Component Value Date    WBC 8.4 02/15/2021    WBC 8.9 2021    WBC 10.3 2021    HGB 11.8 02/15/2021    HCT 38.1 02/15/2021    MCV 88.4 02/15/2021     02/15/2021     Lab Results   Component Value Date    NEUTROABS 4.20 02/15/2021    NEUTROABS 4.63 2021    NEUTROABS 5.69 2021     Lab Results   Component Value Date    CRPHS 6.2 (H) 2019     Lab Results   Component Value Date    ALT 5 02/15/2021    AST 13 02/15/2021    ALKPHOS 61 02/15/2021    BILITOT 0.2 02/15/2021     Lab Results weeks  · Monitor labs  · Will follow with you         Adrian Rodriguez    9:52 AM  2/15/2021

## 2021-02-18 NOTE — DISCHARGE SUMMARY
Patient ID: Hillary Vee      Patient's PCP: Yuni Rodriguez MD    Admit Date: 2/10/2021   Discharge Date: 2/15/2021  Admitting Physician: Bernard Brink MD  Discharge Physician: Yuni Rodriguez   Discharge Diagnoses: -Cellulitis/abscess R middle finger and hand. Patient Active Problem List   Diagnosis Code    Morbid obesity with BMI of 40.0-44.9, adult ([facility] [address] E66.01, Z68.41    Essential hypertension I10    Hyperlipidemia LDL goal <100 E78.5    Altered mental status R41.82    Cellulitis and abscess of upper extremity L03.119, L02.419       Hospital Course: The patient is a 44 y.o. female who presents with infected middle finger. Being an employee of [employer], was started on oral Bactrim DS Monday. Tuesday, needle incision and Wednesday, more thorough I an D. With yesterday, increase in pain, swelling which extended into the hand. Presented to ER Friday, where she was noted to be slightly tachycardic with elevated WBC/left shift. Given Roscoe Book in ER and admitted. Given IVF, pancultured. She does note a bit of headache and altered mental status due to Morphine which quickly cleared. Morphine discontinued. ID and Dr. Joseph Abbott in consult. Ancef and Doxycycline continued until wound cx + MSSA. Doxycycline discontinued at that point. Was slow to respond but improving. ID felt that she would be best served with PICC line and IV atb X 10 days. PICC line placed left arm and d/c'd home on IV Ancef 2 g q 8 hours. Off work at least until 2/22. See me 1 wk. See ID in 2 wks.       Physical Exam:  /76   Pulse 78   Temp 97.8 °F (36.6 °C) (Oral)   Resp 16   Ht 5' 8\" (1.727 m)   Wt 297 lb (134.7 kg)   LMP 02/13/2018   SpO2 98%   BMI 45.16 kg/m²   General appearance: alert, appears stated age and cooperative  Head: Normocephalic, without obvious abnormality, atraumatic  Eyes: conjunctivae/corneas clear. PERRL, EOM's intact.   Ears: External ears -normal  Nose: No drainage or sinus tenderness. Throat: lips, mucosa, and tongue normal; teeth and gums normal   Neck: no adenopathy, no carotid bruit, no JVD, supple, symmetrical, trachea midline. Prominent thyroid, no tenderness/mass/nodules  Lungs: Clear to A and P  Heart: regular rate and rhythm, S1, S2 normal, no murmur, regular rate and rhythm ,no precordial heave  Abdomen:soft, non-tender; non-distended normal bowel sounds no masses, no organomegaly  Extremities:-CCE,Homans sign is negative, no sign of DVT. R 3rd digit with open wound. No current drainage. Edema extends to dorsal aspect of hand. Wrist, Forearm appear normal. She can flex, extend digits though restricted by edema  Neurologic: Grossly normal    XR HAND RIGHT (MIN 3 VIEWS)   Final Result   No acute osseous abnormality. Diffuse soft tissue swelling. Please consider MR or triple phase bone scan and if there is clinical   suspicion for osteomyelitis. Consults: ID and orthopedic surgery  Treatments: IV hydration and antibiotics: Ancef  Disposition: home  Discharged Condition: Stable  Follow Up: Primary Care Physician in one week  Discharge Medications:   Leif Chung   Home Medication Instructions WTL:[id number]    Printed on:02/17/21 2036   Medication Information                      acetaminophen (TYLENOL) 500 MG tablet  Take 1,000 mg by mouth every 6 hours as needed for Pain             aspirin 81 MG EC tablet  Take 1 tablet by mouth 2 times daily             ceFAZolin (ANCEF) infusion  Infuse 2,000 mg intravenously every 8 hours for 10 days Compound per protocol             hydrochlorothiazide (HYDRODIURIL) 12.5 MG tablet  Take 12.5 mg by mouth every morning              HYDROcodone-acetaminophen (NORCO) 5-325 MG per tablet  Take 1 tablet by mouth every 6 hours as needed for Pain.  Last dose 2/10/21 [email or web address]             Magnesium 100 MG CAPS  Take 1 capsule by mouth every morning              topiramate (TOPAMAX) 25 MG tablet  Take 25 mg by mouth every morning (before breakfast)             vitamin D3 (CHOLECALCIFEROL) 400 units TABS tablet  Take 800 Units by mouth every morning                 Activity: activity as tolerated  Diet: cardiac dietWound Care: as directed  Time Spent on discharge is more than 30 minutes discussing plan of care and discharge medications.    -Cellulitis/abscess R middle finger and hand. Post outpatient I+D. -Hypertension  -Hx migraine headache  -Hx vertebrobasilar TIA  -KARL on CPAP  -Prediabetes.     Signed:  Electronically signed by Sabrina Saleh MD on 2/17/2021 at 8:36 PM

## 2021-02-23 NOTE — PROGRESS NOTES
Physician Progress Note      PATIENT:               Karely La  CSN #:                  [id number]  :                       1970  ADMIT DATE:       2/10/2021 8:35 PM  [address] DATE:        2/15/2021 12:00 PM  RESPONDING  PROVIDER #:        Candida Osullivan MD          QUERY TEXT:    Dear Attending Provider,    Pt admitted with Cellulitis/abscess R middle finger and hand. Noted   documentation of Sepsis per ED physician. If possible, please document in   progress notes and discharge summary:    The medical record reflects the following:  Risk Factors: Cellulitis/abscess, on Bactrim  Clinical Indicators: 2/10: wbc-16.8, Per ID Note:The patient developed fever   chills headache and nausea; Systemic inflammatory response syndrome;    2/10-97.4  106  16     IM Note:slightly tachycardic with elevated WBC/left   shift  Treatment: IVF, vanco iv, ancef iv, cultures, ID consult, vibramycin iv. Thank you,  Stiven Sanders RN, BSN, CCDS  [phone]  Options provided:  -- Sepsis confirmed present on admission  -- Sepsis ruled out  -- Other - I will add my own diagnosis  -- Disagree - Not applicable / Not valid  -- Disagree - Clinically unable to determine / Unknown  -- Refer to Clinical Documentation Reviewer    PROVIDER RESPONSE TEXT:    The diagnosis of Sepsis was ruled out. Query created by:  Berneda Severs on 2021 1:50 PM      Electronically signed by:  Candida Osullivan MD 2021 12:38 PM

## 2022-01-05 ENCOUNTER — OFFICE VISIT (OUTPATIENT)
Dept: FAMILY MEDICINE CLINIC | Age: 52
End: 2022-01-05
Payer: COMMERCIAL

## 2022-01-05 VITALS
OXYGEN SATURATION: 98 % | TEMPERATURE: 97.7 F | BODY MASS INDEX: 43.5 KG/M2 | HEART RATE: 80 BPM | SYSTOLIC BLOOD PRESSURE: 120 MMHG | DIASTOLIC BLOOD PRESSURE: 78 MMHG | WEIGHT: 293 LBS

## 2022-01-05 DIAGNOSIS — U07.1 COVID-19: Primary | ICD-10-CM

## 2022-01-05 DIAGNOSIS — R50.9 FEVER, UNSPECIFIED FEVER CAUSE: ICD-10-CM

## 2022-01-05 LAB
INFLUENZA A ANTIBODY: NORMAL
INFLUENZA B ANTIBODY: NORMAL
Lab: ABNORMAL
QC PASS/FAIL: ABNORMAL
SARS-COV-2 RDRP RESP QL NAA+PROBE: POSITIVE

## 2022-01-05 PROCEDURE — 87804 INFLUENZA ASSAY W/OPTIC: CPT | Performed by: PHYSICIAN ASSISTANT

## 2022-01-05 PROCEDURE — 99203 OFFICE O/P NEW LOW 30 MIN: CPT | Performed by: PHYSICIAN ASSISTANT

## 2022-01-05 PROCEDURE — 87635 SARS-COV-2 COVID-19 AMP PRB: CPT | Performed by: PHYSICIAN ASSISTANT

## 2022-01-05 NOTE — PROGRESS NOTES
22  Jonna Soler : 1970 Sex: female  Age 46 y.o. Subjective:  Chief Complaint   Patient presents with    Fever    Headache    Fatigue         HPI:   Jonna Soler , 46 y.o. female presents to [facility] care for evaluation of fever, body aches, headache    HPI  CC:  Fever, body aches, headache    Onset:  2 days    Fever:  Yes    TMAX:   100.7 °F    Vaccine: No   Booster:  No   Flu shot:  No   Exposure:  Yes,  being evaluated for COVID     Treatments:  tylenol    Aggravating or Alleviating factors:  Headache, jaw, achy, shortness of breath, no cough  Chest pain:  No   SOB:  Yes   Myalgias: Yes       Miscellaneous: The patient was unable to get the vaccine because of multiple conditions including her stroke and seizures  COVID previously: No         ROS:   Unless otherwise stated in this report the patient's positive and negative responses for review of systems for constitutional, eyes, ENT, cardiovascular, respiratory, gastrointestinal, neurological, , musculoskeletal, and integument systems and related systems to the presenting problem are either stated in the history of present illness or were not pertinent or were negative for the symptoms and/or complaints related to the presenting medical problem. Positives and pertinent negatives as per HPI. All others reviewed and are negative.       PMH:     Past Medical History:   Diagnosis Date    Arthritis     Headache     LEFT SIDE LAST 2 DAYS    Hypertension     Left-sided headache     Left-sided weakness     ESPECIALLY FINE MOTOR/ARM    Numbness     UPPER LEG & ARM ON LEFT SIDE    TIA (transient ischemic attack) 2018    Trouble swallowing     THICK OR DRY FOODS       Past Surgical History:   Procedure Laterality Date    ADRENALECTOMY      BREAST SURGERY      ENDOMETRIAL ABLATION      PICC LINE INSERTION NURSE  2021         TONSILLECTOMY AND ADENOIDECTOMY         Family History   Problem Relation Age of Onset  Diabetes Mother     Arthritis Mother     Heart Disease Father     Thyroid Disease Father     Allergy (Severe) Father     Bleeding Prob Father        Medications:     Current Outpatient Medications:     topiramate (TOPAMAX) 25 MG tablet, Take 25 mg by mouth every morning (before breakfast), Disp: , Rfl:     acetaminophen (TYLENOL) 500 MG tablet, Take 1,000 mg by mouth every 6 hours as needed for Pain, Disp: , Rfl:     hydrochlorothiazide (HYDRODIURIL) 12.5 MG tablet, Take 12.5 mg by mouth every morning , Disp: , Rfl:     aspirin 81 MG EC tablet, Take 1 tablet by mouth 2 times daily (Patient taking differently: Take 81 mg by mouth every morning ), Disp: 60 tablet, Rfl: 5    vitamin D3 (CHOLECALCIFEROL) 400 units TABS tablet, Take 800 Units by mouth every morning , Disp: , Rfl:     Allergies: Allergies   Allergen Reactions    Bee Venom     Sudafed [Pseudoephedrine Hcl]        Social History:     Social History     Tobacco Use    Smoking status: Never Smoker    Smokeless tobacco: Never Used   Vaping Use    Vaping Use: Never used   Substance Use Topics    Alcohol use: No    Drug use: No       Patient lives at home. Physical Exam:     Vitals:    01/05/22 1247   BP: 120/78   Site: Right Upper Arm   Position: Sitting   Pulse: 80   Temp: 97.7 °F (36.5 °C)   TempSrc: Temporal   SpO2: 98%   Weight: 294 lb 9.6 oz (133.6 kg)       Exam:  Physical Exam  Nurse's notes and vital signs reviewed. The patient is not hypoxic. ? General: Alert, no acute distress, patient resting comfortably Patient is not toxic or lethargic. Skin: Warm, intact, no pallor noted. There is no evidence of rash at this time. Head: Normocephalic, atraumatic  Eye: Normal conjunctiva  Ears, Nose, Throat: Right tympanic membrane clear, left tympanic membrane clear. No drainage or discharge noted. No pre- or post-auricular tenderness, erythema, or swelling noted. No rhinorrhea or congestion noted.    Posterior oropharynx shows no erythema, tonsillar hypertrophy, or exudate. the uvula is midline. No trismus or drooling is noted. Moist mucous membranes. Cardiovascular: Regular Rate and Rhythm  Respiratory: No acute distress, no rhonchi, wheezing or crackles noted. No stridor or retractions are noted. Neurological: A&O x4, normal speech  Psychiatric: Cooperative         Testing:     Results for orders placed or performed in visit on 01/05/22   POCT COVID-19, Rapid   Result Value Ref Range    SARS-COV-2, RdRp gene Positive (A) Negative    Lot Number [id number]     QC Pass/Fail pass    POCT Influenza A/B   Result Value Ref Range    Influenza A Ab neg     Influenza B Ab neg            Medical Decision Making:     Vital signs reviewed    Past medical history reviewed. Allergies reviewed. Medications reviewed. Patient on arrival does not appear to be in any apparent distress or discomfort. The patient has been seen and evaluated. The patient does not appear to be toxic or lethargic. The patient had covid that was positive    Influenza was negative. The patient is unsure if she can receive the monoclonal antibody therapy. I would have her check with her neurologist who she sees in [facility] [employer]. Her Covid is positive. Flu is negative. COVID-19 was detected as positive. We will have the patient quarantine, isolate per CDC guidelines. Call with any questions or concerns. Return if any of the signs or symptoms change or worsen for further evaluation and possible imaging/chest x-ray. Continue with vitamin regimen, fluids, rest.  Use Motrin, Tylenol. Monitor pulse ox (less than 92% go to ED). Follow-up with PCP or return to [facility] for evaluation. If symptoms worsen go directly to the ED    The patient was educated on the proper dosage of motrin and tylenol and the appropriate intervals of each. The patient is to increase fluid intake over the next several days. The patient is to use OTC decongestant as needed.      The patient is to return to express care or go directly to the emergency department should any of the signs or symptoms worsen. The patient is to followup with primary care physician in 2-3 days for repeat evaluation. The patient has no other questions or concerns at this time the patient will be discharged home. Clinical Impression:   Mavis Leger was seen today for fever, headache and fatigue. Diagnoses and all orders for this visit:    COVID-19    Fever, unspecified fever cause  -     POCT COVID-19, Rapid  -     POCT Influenza A/B        The patient is to call for any concerns or return if any of the signs or symptoms worsen. The patient is to follow-up with PCP in the next 2-3 days for repeat evaluation repeat assessment or go directly to the emergency department.      SIGNATURE: Lulú Pride III, PA-C

## 2022-03-29 ENCOUNTER — HOSPITAL ENCOUNTER (OUTPATIENT)
Age: 52
Discharge: HOME | End: 2022-03-31

## 2022-03-29 LAB
ABO/RH: NORMAL
ANTIBODY SCREEN: NORMAL

## 2022-03-29 PROCEDURE — 86900 BLOOD TYPING SEROLOGIC ABO: CPT

## 2022-03-29 PROCEDURE — 86850 RBC ANTIBODY SCREEN: CPT

## 2022-03-29 PROCEDURE — 87081 CULTURE SCREEN ONLY: CPT

## 2022-03-29 PROCEDURE — 86901 BLOOD TYPING SEROLOGIC RH(D): CPT

## 2022-03-31 LAB — MRSA CULTURE ONLY: NORMAL

## 2022-04-06 ENCOUNTER — HOSPITAL ENCOUNTER (OUTPATIENT)
Age: 52
Discharge: HOME | End: 2022-04-08

## 2022-04-06 LAB
ANION GAP SERPL CALCULATED.3IONS-SCNC: 10 MMOL/L (ref 7–16)
BUN BLDV-MCNC: 11 MG/DL (ref 6–20)
CALCIUM SERPL-MCNC: 8.4 MG/DL (ref 8.6–10.2)
CHLORIDE BLD-SCNC: 103 MMOL/L (ref 98–107)
CO2: 25 MMOL/L (ref 22–29)
CREAT SERPL-MCNC: 0.7 MG/DL (ref 0.5–1)
GFR AFRICAN AMERICAN: >60
GFR NON-AFRICAN AMERICAN: >60 ML/MIN/1.73
GLUCOSE BLD-MCNC: 129 MG/DL (ref 74–99)
HCT VFR BLD CALC: 36.2 % (ref 34–48)
HEMOGLOBIN: 11.4 G/DL (ref 11.5–15.5)
MCH RBC QN AUTO: 27.5 PG (ref 26–35)
MCHC RBC AUTO-ENTMCNC: 31.5 % (ref 32–34.5)
MCV RBC AUTO: 87.4 FL (ref 80–99.9)
PDW BLD-RTO: 13.4 FL (ref 11.5–15)
PLATELET # BLD: 285 E9/L (ref 130–450)
PMV BLD AUTO: 11.7 FL (ref 7–12)
POTASSIUM SERPL-SCNC: 3.7 MMOL/L (ref 3.5–5)
RBC # BLD: 4.14 E12/L (ref 3.5–5.5)
SODIUM BLD-SCNC: 138 MMOL/L (ref 132–146)
WBC # BLD: 12.6 E9/L (ref 4.5–11.5)

## 2022-04-06 PROCEDURE — 85027 COMPLETE CBC AUTOMATED: CPT

## 2022-04-06 PROCEDURE — 80048 BASIC METABOLIC PNL TOTAL CA: CPT

## 2022-04-07 ENCOUNTER — HOSPITAL ENCOUNTER (OUTPATIENT)
Age: 52
Discharge: HOME | End: 2022-04-09

## 2022-04-07 LAB
ANION GAP SERPL CALCULATED.3IONS-SCNC: 12 MMOL/L (ref 7–16)
BUN BLDV-MCNC: 11 MG/DL (ref 6–20)
CALCIUM SERPL-MCNC: 8.6 MG/DL (ref 8.6–10.2)
CHLORIDE BLD-SCNC: 105 MMOL/L (ref 98–107)
CO2: 24 MMOL/L (ref 22–29)
CREAT SERPL-MCNC: 0.8 MG/DL (ref 0.5–1)
GFR AFRICAN AMERICAN: >60
GFR NON-AFRICAN AMERICAN: >60 ML/MIN/1.73
GLUCOSE BLD-MCNC: 110 MG/DL (ref 74–99)
HCT VFR BLD CALC: 31 % (ref 34–48)
HEMOGLOBIN: 9.9 G/DL (ref 11.5–15.5)
MCH RBC QN AUTO: 27.6 PG (ref 26–35)
MCHC RBC AUTO-ENTMCNC: 31.9 % (ref 32–34.5)
MCV RBC AUTO: 86.4 FL (ref 80–99.9)
PDW BLD-RTO: 13.7 FL (ref 11.5–15)
PLATELET # BLD: 240 E9/L (ref 130–450)
PMV BLD AUTO: 12 FL (ref 7–12)
POTASSIUM SERPL-SCNC: 3.4 MMOL/L (ref 3.5–5)
RBC # BLD: 3.59 E12/L (ref 3.5–5.5)
SODIUM BLD-SCNC: 141 MMOL/L (ref 132–146)
WBC # BLD: 11.5 E9/L (ref 4.5–11.5)

## 2022-04-07 PROCEDURE — 85027 COMPLETE CBC AUTOMATED: CPT

## 2022-04-07 PROCEDURE — 80048 BASIC METABOLIC PNL TOTAL CA: CPT

## 2022-04-08 ENCOUNTER — HOSPITAL ENCOUNTER (OUTPATIENT)
Age: 52
Discharge: HOME | End: 2022-04-10

## 2022-04-08 LAB
ANION GAP SERPL CALCULATED.3IONS-SCNC: 9 MMOL/L (ref 7–16)
BUN BLDV-MCNC: 15 MG/DL (ref 6–20)
CALCIUM SERPL-MCNC: 8.3 MG/DL (ref 8.6–10.2)
CHLORIDE BLD-SCNC: 103 MMOL/L (ref 98–107)
CO2: 26 MMOL/L (ref 22–29)
CREAT SERPL-MCNC: 0.8 MG/DL (ref 0.5–1)
GFR AFRICAN AMERICAN: >60
GFR NON-AFRICAN AMERICAN: >60 ML/MIN/1.73
GLUCOSE BLD-MCNC: 105 MG/DL (ref 74–99)
HCT VFR BLD CALC: 28.5 % (ref 34–48)
HEMOGLOBIN: 9 G/DL (ref 11.5–15.5)
MCH RBC QN AUTO: 27.7 PG (ref 26–35)
MCHC RBC AUTO-ENTMCNC: 31.6 % (ref 32–34.5)
MCV RBC AUTO: 87.7 FL (ref 80–99.9)
PDW BLD-RTO: 13.8 FL (ref 11.5–15)
PLATELET # BLD: 212 E9/L (ref 130–450)
PMV BLD AUTO: 11.9 FL (ref 7–12)
POTASSIUM SERPL-SCNC: 3.4 MMOL/L (ref 3.5–5)
RBC # BLD: 3.25 E12/L (ref 3.5–5.5)
SODIUM BLD-SCNC: 138 MMOL/L (ref 132–146)
WBC # BLD: 9.5 E9/L (ref 4.5–11.5)

## 2022-04-08 PROCEDURE — 85027 COMPLETE CBC AUTOMATED: CPT

## 2022-04-08 PROCEDURE — 80048 BASIC METABOLIC PNL TOTAL CA: CPT

## 2023-02-24 ENCOUNTER — OFFICE VISIT (OUTPATIENT)
Dept: PODIATRY | Age: 53
End: 2023-02-24
Payer: COMMERCIAL

## 2023-02-24 VITALS — WEIGHT: 293 LBS | BODY MASS INDEX: 44.41 KG/M2 | HEIGHT: 68 IN

## 2023-02-24 DIAGNOSIS — M76.62 ACHILLES TENDINITIS, LEFT LEG: ICD-10-CM

## 2023-02-24 DIAGNOSIS — M25.572 LEFT ANKLE PAIN, UNSPECIFIED CHRONICITY: Primary | ICD-10-CM

## 2023-02-24 PROCEDURE — 99213 OFFICE O/P EST LOW 20 MIN: CPT | Performed by: PODIATRIST

## 2023-03-07 NOTE — PROGRESS NOTES
Ruth Bath : 1970 Sex: female  Age: 45 y.o. Patient was referred by Nikkie Jackson MD    CC:    Tenderness on the back of the left Achilles tendon    HPI:   This familiar 78-year-old female patient our practice seen today pain in the back of left heel. Symptoms present several months. No recent injury or trauma. Did have radiographs at [facility] and did bring her images with her today. Tenderness mostly at the insertion of the left Achilles tendon after she is standing and walking. Denies any calf pain. No significant pain on the bottom of the left foot. No recent formal physical therapy for left foot. No additional pedal complaints at this time.     ROS:  Const: Denies constitutional symptoms  Musculo: Denies symptoms other than stated above  Skin: Denies symptoms other than stated above       Current Outpatient Medications:     hydrochlorothiazide (HYDRODIURIL) 12.5 MG tablet, Take 12.5 mg by mouth every morning , Disp: , Rfl:     aspirin 81 MG EC tablet, Take 1 tablet by mouth 2 times daily (Patient taking differently: Take 81 mg by mouth every morning), Disp: 60 tablet, Rfl: 5    vitamin D3 (CHOLECALCIFEROL) 400 units TABS tablet, Take 800 Units by mouth every morning , Disp: , Rfl:     topiramate (TOPAMAX) 25 MG tablet, Take 25 mg by mouth every morning (before breakfast) (Patient not taking: Reported on 2023), Disp: , Rfl:     acetaminophen (TYLENOL) 500 MG tablet, Take 1,000 mg by mouth every 6 hours as needed for Pain (Patient not taking: Reported on 2023), Disp: , Rfl:   Allergies   Allergen Reactions    Bee Venom     Sudafed [Pseudoephedrine Hcl]        Past Medical History:   Diagnosis Date    Arthritis     Headache     LEFT SIDE LAST 2 DAYS    Hypertension     Left-sided headache     Left-sided weakness     ESPECIALLY FINE MOTOR/ARM    Numbness     UPPER LEG & ARM ON LEFT SIDE    TIA (transient ischemic attack) 2018    Trouble swallowing     THICK OR DRY FOODS           Vitals:    02/24/23 1553   Weight: 294 lb (133.4 kg)   Height: 5' 8\" (1.727 m)       Work History/Social History: Foot and ankle history:     Focused Lower Extremity Physical Exam:    Neurovascular examination:    Dorsalis Pedis palpable bilateral.  Posterior tibialis palpable bilateral.    Capillary Refill Time:  Immediate return  Hair growth:  Symmetrical and bilateral   Skin:  Not atrophic  Edema: No edema bilateral feet or ankles. Neurologic:  Light touch intact bilateral.      Musculoskeletal/ Orthopedic examination:    Equinis: Absent bilateral  Dorsiflexion, plantarflexion, inversion, eversion bilateral 5 out of 5 muscle strength  Wiggling toes  Negative Homans. Negative Willard. Tenderness mid substance and insertion of Achilles tendon. No pain overlying plantar fascia. Dermatology examination:    No open skin lesions or abrasions bilateral lower extremity. Assessment and Plan:  Jonna was seen today for ankle pain and foot pain. Diagnoses and all orders for this visit:    Left ankle pain, unspecified chronicity  -     Cancel: XR ANKLE LEFT (MIN 3 VIEWS); Future    Achilles tendinitis, left leg  -     Amb External Referral To Physical Therapy      Jonna seen today pain on the back of her left Achilles tendon. Clinically presents insertional Achilles tendinitis. Achilles tendon is clinically intact. We did discuss MRI if continued symptoms. I did review and discussed radiographs from [facility].  I did recommend heel cups to offload the back of Achilles tendon. We did discuss topical Voltaren 1%  I did write a formal physical therapy order for left Achilles tendinitis. If continued symptoms would consider MRI at follow-up. Return in about 6 weeks (around 4/7/2023). Seen By:  Nickie Anderson DPM      Document was created using voice recognition software. Note was reviewed, however may contain grammatical errors.

## 2023-05-05 ENCOUNTER — OFFICE VISIT (OUTPATIENT)
Dept: PODIATRY | Age: 53
End: 2023-05-05
Payer: COMMERCIAL

## 2023-05-05 VITALS — BODY MASS INDEX: 43.4 KG/M2 | WEIGHT: 293 LBS | HEIGHT: 69 IN

## 2023-05-05 DIAGNOSIS — M25.572 LEFT ANKLE PAIN, UNSPECIFIED CHRONICITY: Primary | ICD-10-CM

## 2023-05-05 DIAGNOSIS — M76.822 POSTERIOR TIBIAL TENDINITIS OF LEFT LOWER EXTREMITY: ICD-10-CM

## 2023-05-05 DIAGNOSIS — M76.62 ACHILLES TENDINITIS, LEFT LEG: ICD-10-CM

## 2023-05-05 PROCEDURE — 99214 OFFICE O/P EST MOD 30 MIN: CPT | Performed by: PODIATRIST

## 2023-11-09 ENCOUNTER — HOSPITAL ENCOUNTER (EMERGENCY)
Age: 53
Discharge: ANOTHER HOSPITAL | End: 2023-11-10
Attending: STUDENT IN AN ORGANIZED HEALTH CARE EDUCATION/TRAINING PROGRAM
Payer: COMMERCIAL

## 2023-11-09 ENCOUNTER — APPOINTMENT (OUTPATIENT)
Dept: CT IMAGING | Age: 53
End: 2023-11-09
Payer: COMMERCIAL

## 2023-11-09 ENCOUNTER — APPOINTMENT (OUTPATIENT)
Dept: MRI IMAGING | Age: 53
End: 2023-11-09
Payer: COMMERCIAL

## 2023-11-09 DIAGNOSIS — M54.59 INTRACTABLE LOW BACK PAIN: Primary | ICD-10-CM

## 2023-11-09 DIAGNOSIS — M25.48 EFFUSION OF LUMBAR FACET JOINT: ICD-10-CM

## 2023-11-09 DIAGNOSIS — M51.26 HERNIATED INTERVERTEBRAL DISC OF LUMBAR SPINE: ICD-10-CM

## 2023-11-09 LAB
ALBUMIN SERPL-MCNC: 4.3 G/DL (ref 3.5–5.2)
ALP SERPL-CCNC: 96 U/L (ref 35–104)
ALT SERPL-CCNC: 12 U/L (ref 0–32)
ANION GAP SERPL CALCULATED.3IONS-SCNC: 13 MMOL/L (ref 7–16)
AST SERPL-CCNC: 15 U/L (ref 0–31)
BASOPHILS # BLD: 0.07 K/UL (ref 0–0.2)
BASOPHILS NFR BLD: 1 % (ref 0–2)
BILIRUB SERPL-MCNC: 0.9 MG/DL (ref 0–1.2)
BILIRUB UR QL STRIP: NEGATIVE
BUN SERPL-MCNC: 11 MG/DL (ref 6–20)
CALCIUM SERPL-MCNC: 10.1 MG/DL (ref 8.6–10.2)
CHLORIDE SERPL-SCNC: 103 MMOL/L (ref 98–107)
CLARITY UR: CLEAR
CO2 SERPL-SCNC: 23 MMOL/L (ref 22–29)
COLOR UR: YELLOW
CREAT SERPL-MCNC: 0.8 MG/DL (ref 0.5–1)
CRP SERPL HS-MCNC: 46 MG/L (ref 0–5)
EKG ATRIAL RATE: 85 BPM
EKG P AXIS: 40 DEGREES
EKG P-R INTERVAL: 126 MS
EKG Q-T INTERVAL: 348 MS
EKG QRS DURATION: 88 MS
EKG QTC CALCULATION (BAZETT): 414 MS
EKG R AXIS: 80 DEGREES
EKG T AXIS: 69 DEGREES
EKG VENTRICULAR RATE: 85 BPM
EOSINOPHIL # BLD: 0.05 K/UL (ref 0.05–0.5)
EOSINOPHILS RELATIVE PERCENT: 0 % (ref 0–6)
ERYTHROCYTE [DISTWIDTH] IN BLOOD BY AUTOMATED COUNT: 13.9 % (ref 11.5–15)
ERYTHROCYTE [SEDIMENTATION RATE] IN BLOOD BY WESTERGREN METHOD: 35 MM/HR (ref 0–20)
GFR SERPL CREATININE-BSD FRML MDRD: >60 ML/MIN/1.73M2
GLUCOSE SERPL-MCNC: 120 MG/DL (ref 74–99)
GLUCOSE UR STRIP-MCNC: NEGATIVE MG/DL
HCT VFR BLD AUTO: 42.6 % (ref 34–48)
HGB BLD-MCNC: 14.4 G/DL (ref 11.5–15.5)
HGB UR QL STRIP.AUTO: NEGATIVE
IMM GRANULOCYTES # BLD AUTO: 0.08 K/UL (ref 0–0.58)
IMM GRANULOCYTES NFR BLD: 1 % (ref 0–5)
INR PPP: 1.2
KETONES UR STRIP-MCNC: ABNORMAL MG/DL
LACTATE BLDV-SCNC: 1.8 MMOL/L (ref 0.5–2.2)
LEUKOCYTE ESTERASE UR QL STRIP: NEGATIVE
LIPASE SERPL-CCNC: 48 U/L (ref 13–60)
LYMPHOCYTES NFR BLD: 1.53 K/UL (ref 1.5–4)
LYMPHOCYTES RELATIVE PERCENT: 10 % (ref 20–42)
MAGNESIUM SERPL-MCNC: 1.6 MG/DL (ref 1.6–2.6)
MCH RBC QN AUTO: 28 PG (ref 26–35)
MCHC RBC AUTO-ENTMCNC: 33.8 G/DL (ref 32–34.5)
MCV RBC AUTO: 82.9 FL (ref 80–99.9)
MONOCYTES NFR BLD: 1.35 K/UL (ref 0.1–0.95)
MONOCYTES NFR BLD: 9 % (ref 2–12)
NEUTROPHILS NFR BLD: 80 % (ref 43–80)
NEUTS SEG NFR BLD: 12.36 K/UL (ref 1.8–7.3)
NITRITE UR QL STRIP: NEGATIVE
PARTIAL THROMBOPLASTIN TIME: 30.1 SEC (ref 24.5–35.1)
PH UR STRIP: 6.5 [PH] (ref 5–9)
PLATELET # BLD AUTO: 310 K/UL (ref 130–450)
PMV BLD AUTO: 10.6 FL (ref 7–12)
POTASSIUM SERPL-SCNC: 3.6 MMOL/L (ref 3.5–5)
PROT SERPL-MCNC: 7.4 G/DL (ref 6.4–8.3)
PROT UR STRIP-MCNC: NEGATIVE MG/DL
PROTHROMBIN TIME: 13.3 SEC (ref 9.3–12.4)
RBC # BLD AUTO: 5.14 M/UL (ref 3.5–5.5)
RBC #/AREA URNS HPF: ABNORMAL /HPF
SODIUM SERPL-SCNC: 139 MMOL/L (ref 132–146)
SP GR UR STRIP: <1.005 (ref 1–1.03)
UROBILINOGEN UR STRIP-ACNC: 0.2 EU/DL (ref 0–1)
WBC #/AREA URNS HPF: ABNORMAL /HPF
WBC OTHER # BLD: 15.4 K/UL (ref 4.5–11.5)

## 2023-11-09 PROCEDURE — 96366 THER/PROPH/DIAG IV INF ADDON: CPT

## 2023-11-09 PROCEDURE — 93010 ELECTROCARDIOGRAM REPORT: CPT | Performed by: INTERNAL MEDICINE

## 2023-11-09 PROCEDURE — 85025 COMPLETE CBC W/AUTO DIFF WBC: CPT

## 2023-11-09 PROCEDURE — 72148 MRI LUMBAR SPINE W/O DYE: CPT

## 2023-11-09 PROCEDURE — 99285 EMERGENCY DEPT VISIT HI MDM: CPT

## 2023-11-09 PROCEDURE — 6360000002 HC RX W HCPCS: Performed by: EMERGENCY MEDICINE

## 2023-11-09 PROCEDURE — 2580000003 HC RX 258: Performed by: STUDENT IN AN ORGANIZED HEALTH CARE EDUCATION/TRAINING PROGRAM

## 2023-11-09 PROCEDURE — 86140 C-REACTIVE PROTEIN: CPT

## 2023-11-09 PROCEDURE — 93005 ELECTROCARDIOGRAM TRACING: CPT | Performed by: STUDENT IN AN ORGANIZED HEALTH CARE EDUCATION/TRAINING PROGRAM

## 2023-11-09 PROCEDURE — 83605 ASSAY OF LACTIC ACID: CPT

## 2023-11-09 PROCEDURE — 72149 MRI LUMBAR SPINE W/DYE: CPT

## 2023-11-09 PROCEDURE — 6360000004 HC RX CONTRAST MEDICATION: Performed by: RADIOLOGY

## 2023-11-09 PROCEDURE — 96372 THER/PROPH/DIAG INJ SC/IM: CPT

## 2023-11-09 PROCEDURE — 85730 THROMBOPLASTIN TIME PARTIAL: CPT

## 2023-11-09 PROCEDURE — 96375 TX/PRO/DX INJ NEW DRUG ADDON: CPT

## 2023-11-09 PROCEDURE — 96368 THER/DIAG CONCURRENT INF: CPT

## 2023-11-09 PROCEDURE — 83690 ASSAY OF LIPASE: CPT

## 2023-11-09 PROCEDURE — 80053 COMPREHEN METABOLIC PANEL: CPT

## 2023-11-09 PROCEDURE — 96365 THER/PROPH/DIAG IV INF INIT: CPT

## 2023-11-09 PROCEDURE — 2580000003 HC RX 258: Performed by: EMERGENCY MEDICINE

## 2023-11-09 PROCEDURE — 85610 PROTHROMBIN TIME: CPT

## 2023-11-09 PROCEDURE — 74177 CT ABD & PELVIS W/CONTRAST: CPT

## 2023-11-09 PROCEDURE — 87040 BLOOD CULTURE FOR BACTERIA: CPT

## 2023-11-09 PROCEDURE — 6360000002 HC RX W HCPCS: Performed by: STUDENT IN AN ORGANIZED HEALTH CARE EDUCATION/TRAINING PROGRAM

## 2023-11-09 PROCEDURE — 72131 CT LUMBAR SPINE W/O DYE: CPT

## 2023-11-09 PROCEDURE — A9579 GAD-BASE MR CONTRAST NOS,1ML: HCPCS | Performed by: RADIOLOGY

## 2023-11-09 PROCEDURE — 81001 URINALYSIS AUTO W/SCOPE: CPT

## 2023-11-09 PROCEDURE — 96376 TX/PRO/DX INJ SAME DRUG ADON: CPT

## 2023-11-09 PROCEDURE — 85652 RBC SED RATE AUTOMATED: CPT

## 2023-11-09 PROCEDURE — 83735 ASSAY OF MAGNESIUM: CPT

## 2023-11-09 RX ORDER — MORPHINE SULFATE 2 MG/ML
2 INJECTION, SOLUTION INTRAMUSCULAR; INTRAVENOUS EVERY 4 HOURS PRN
Status: CANCELLED | OUTPATIENT
Start: 2023-11-09

## 2023-11-09 RX ORDER — ONDANSETRON 4 MG/1
4 TABLET, ORALLY DISINTEGRATING ORAL EVERY 8 HOURS PRN
Status: CANCELLED | OUTPATIENT
Start: 2023-11-09

## 2023-11-09 RX ORDER — SODIUM CHLORIDE 0.9 % (FLUSH) 0.9 %
10 SYRINGE (ML) INJECTION PRN
Status: CANCELLED | OUTPATIENT
Start: 2023-11-09

## 2023-11-09 RX ORDER — POTASSIUM CHLORIDE 20 MEQ/1
40 TABLET, EXTENDED RELEASE ORAL PRN
Status: CANCELLED | OUTPATIENT
Start: 2023-11-09

## 2023-11-09 RX ORDER — FENTANYL CITRATE 50 UG/ML
50 INJECTION, SOLUTION INTRAMUSCULAR; INTRAVENOUS ONCE
Status: COMPLETED | OUTPATIENT
Start: 2023-11-09 | End: 2023-11-09

## 2023-11-09 RX ORDER — SODIUM CHLORIDE 0.9 % (FLUSH) 0.9 %
10 SYRINGE (ML) INJECTION EVERY 12 HOURS SCHEDULED
Status: CANCELLED | OUTPATIENT
Start: 2023-11-09

## 2023-11-09 RX ORDER — SENNOSIDES A AND B 8.6 MG/1
1 TABLET, FILM COATED ORAL DAILY PRN
Status: CANCELLED | OUTPATIENT
Start: 2023-11-09

## 2023-11-09 RX ORDER — ORPHENADRINE CITRATE 30 MG/ML
60 INJECTION INTRAMUSCULAR; INTRAVENOUS ONCE
Status: COMPLETED | OUTPATIENT
Start: 2023-11-09 | End: 2023-11-09

## 2023-11-09 RX ORDER — ACETAMINOPHEN 325 MG/1
650 TABLET ORAL EVERY 6 HOURS PRN
Status: CANCELLED | OUTPATIENT
Start: 2023-11-09

## 2023-11-09 RX ORDER — ONDANSETRON 2 MG/ML
4 INJECTION INTRAMUSCULAR; INTRAVENOUS ONCE
Status: COMPLETED | OUTPATIENT
Start: 2023-11-09 | End: 2023-11-09

## 2023-11-09 RX ORDER — KETOROLAC TROMETHAMINE 30 MG/ML
15 INJECTION, SOLUTION INTRAMUSCULAR; INTRAVENOUS ONCE
Status: COMPLETED | OUTPATIENT
Start: 2023-11-09 | End: 2023-11-09

## 2023-11-09 RX ORDER — MORPHINE SULFATE 4 MG/ML
4 INJECTION, SOLUTION INTRAMUSCULAR; INTRAVENOUS ONCE
Status: COMPLETED | OUTPATIENT
Start: 2023-11-09 | End: 2023-11-09

## 2023-11-09 RX ORDER — POTASSIUM CHLORIDE 7.45 MG/ML
10 INJECTION INTRAVENOUS PRN
Status: CANCELLED | OUTPATIENT
Start: 2023-11-09

## 2023-11-09 RX ORDER — ONDANSETRON 2 MG/ML
4 INJECTION INTRAMUSCULAR; INTRAVENOUS EVERY 6 HOURS PRN
Status: CANCELLED | OUTPATIENT
Start: 2023-11-09

## 2023-11-09 RX ORDER — DEXAMETHASONE SODIUM PHOSPHATE 10 MG/ML
10 INJECTION INTRAMUSCULAR; INTRAVENOUS ONCE
Status: COMPLETED | OUTPATIENT
Start: 2023-11-09 | End: 2023-11-09

## 2023-11-09 RX ORDER — SODIUM CHLORIDE 9 MG/ML
INJECTION, SOLUTION INTRAVENOUS PRN
Status: CANCELLED | OUTPATIENT
Start: 2023-11-09

## 2023-11-09 RX ORDER — ACETAMINOPHEN 650 MG/1
650 SUPPOSITORY RECTAL EVERY 6 HOURS PRN
Status: CANCELLED | OUTPATIENT
Start: 2023-11-09

## 2023-11-09 RX ORDER — 0.9 % SODIUM CHLORIDE 0.9 %
1000 INTRAVENOUS SOLUTION INTRAVENOUS ONCE
Status: COMPLETED | OUTPATIENT
Start: 2023-11-09 | End: 2023-11-09

## 2023-11-09 RX ORDER — GABAPENTIN 100 MG/1
100 CAPSULE ORAL ONCE
Status: DISCONTINUED | OUTPATIENT
Start: 2023-11-09 | End: 2023-11-10 | Stop reason: HOSPADM

## 2023-11-09 RX ORDER — ENOXAPARIN SODIUM 100 MG/ML
30 INJECTION SUBCUTANEOUS 2 TIMES DAILY
Status: CANCELLED | OUTPATIENT
Start: 2023-11-09

## 2023-11-09 RX ADMIN — DEXAMETHASONE SODIUM PHOSPHATE 10 MG: 10 INJECTION INTRAMUSCULAR; INTRAVENOUS at 09:04

## 2023-11-09 RX ADMIN — GADOTERIDOL 20 ML: 279.3 INJECTION, SOLUTION INTRAVENOUS at 21:18

## 2023-11-09 RX ADMIN — KETOROLAC TROMETHAMINE 15 MG: 30 INJECTION, SOLUTION INTRAMUSCULAR; INTRAVENOUS at 09:10

## 2023-11-09 RX ADMIN — MORPHINE SULFATE 4 MG: 4 INJECTION, SOLUTION INTRAMUSCULAR; INTRAVENOUS at 16:06

## 2023-11-09 RX ADMIN — ONDANSETRON 4 MG: 2 INJECTION INTRAMUSCULAR; INTRAVENOUS at 04:09

## 2023-11-09 RX ADMIN — SODIUM CHLORIDE 1000 ML: 9 INJECTION, SOLUTION INTRAVENOUS at 04:10

## 2023-11-09 RX ADMIN — VANCOMYCIN HYDROCHLORIDE 3000 MG: 5 INJECTION, POWDER, LYOPHILIZED, FOR SOLUTION INTRAVENOUS at 21:30

## 2023-11-09 RX ADMIN — PIPERACILLIN AND TAZOBACTAM 4500 MG: 4; .5 INJECTION, POWDER, LYOPHILIZED, FOR SOLUTION INTRAVENOUS at 19:53

## 2023-11-09 RX ADMIN — ORPHENADRINE CITRATE 60 MG: 60 INJECTION INTRAMUSCULAR; INTRAVENOUS at 07:22

## 2023-11-09 RX ADMIN — IOPAMIDOL 75 ML: 755 INJECTION, SOLUTION INTRAVENOUS at 05:35

## 2023-11-09 RX ADMIN — FENTANYL CITRATE 50 MCG: 50 INJECTION, SOLUTION INTRAMUSCULAR; INTRAVENOUS at 04:09

## 2023-11-09 ASSESSMENT — PAIN SCALES - GENERAL
PAINLEVEL_OUTOF10: 3
PAINLEVEL_OUTOF10: 10

## 2023-11-09 ASSESSMENT — PAIN DESCRIPTION - LOCATION: LOCATION: ABDOMEN

## 2023-11-09 ASSESSMENT — LIFESTYLE VARIABLES
HOW MANY STANDARD DRINKS CONTAINING ALCOHOL DO YOU HAVE ON A TYPICAL DAY: PATIENT DOES NOT DRINK
HOW OFTEN DO YOU HAVE A DRINK CONTAINING ALCOHOL: NEVER

## 2023-11-09 ASSESSMENT — PAIN - FUNCTIONAL ASSESSMENT: PAIN_FUNCTIONAL_ASSESSMENT: 0-10

## 2023-11-09 NOTE — ED NOTES
Reports she had a brief period of relief but mostly feels like it is coming and going. States back pains radiating down her leg. Requesting medrol dose pack to help with her pains.       Nancy Dasilva, [address] [place] [address]  11/09/23 9768

## 2023-11-09 NOTE — CARE COORDINATION
Transfer to [facility] requested     Of note, I have waited over 10 minutes on hold to speak with the transfer center to no avail     I called and discussed the case with the current ED provider Dr Orville Munoz and have reviewed the chart     I will gladly accept the patient to [facility]     Abnormal MRI lumbar spine below     Contrasted study ordered      IMPRESSION:  Mild spinal stenosis at L4-5. Mild right L4-5 facet effusion with mild edema in the soft tissues dorsal to  the facet joint.

## 2023-11-09 NOTE — ED PROVIDER NOTES
[address] [facility]        Pt Name: Huber Forbes  MRN: [id number]  [address] [facility] 1970  Date of evaluation: 11/9/2023  Provider: Mariam Boone MD  PCP: Jennifer Sweeney MD  Note Started: 3:13 AM EST 11/9/23    CHIEF COMPLAINT       Chief Complaint   Patient presents with    Hip Pain     R, states \"I can't put weight on it and I can't pick my foot up. \", denies injury/trauma    Back Pain     Lower, states \"was dx with a UTI and started on antibiotic and now the pain is getting worse. \"       HISTORY OF PRESENT ILLNESS: 1 or more Elements   History From: Patient    Limitations to history : None  Social Determinants : None    Huber Forbes is a 52 y.o. female who presents with right-sided hip pain that has been worsening since 2 days. Trauma. She does mention that it is associated with numbness and tingling sensation that goes over the right lower extremity. It is ulcerated with certain movements. She is able to bear weight and walk. She does feel nauseous. She does mention that she was recently diagnosed with UTI and started on antibiotics. Also, she is being cared for throat irritation. Denies any fever, chills, vomiting, headache, dizziness, vision changes, neck tenderness or stiffness, weakness, chest pain, palpitations, leg swelling/tenderness, sob, cough, abdominal pain, dysuria, hematuria, diarrhea, constipation, bloody stools. She has a history of bilateral knee replacements. Did have pain on her right hip and back about 6 months back. But this is more severe.     Nursing Notes were all reviewed and agreed with or any disagreements were addressed in the HPI.    ROS:   Pertinent positives and negatives are stated within HPI, all other systems reviewed and are negative.      --------------------------------------------- PAST HISTORY ---------------------------------------------  Past Medical History:  has a past MRI spine to rule out any nerve root compromise. She did have increased inflammatory markers of increased CRP and sedimentation rate. While managing the intractable back pain and working up for possible nerve root compromise, the case was discussed with neurosurgery and was planned to be transferred at [address] [place] [address].  She was given an additional Decadron and empiric antibiotics vancomycin and Zosyn for possible spinal abscess. While awaiting transfer, Plain MRI showed Mild spinal stenosis at L4-5 with mild right L4-5 facet effusion with mild edema in the soft tissues dorsal to the facet joint. With suspicion of spinal/epidural abscess, MRI lumbar spine with contrast was done. MRI with contrast shows effusion at L4-L5 facet joint, no abscess. Please see ED course for more detais:    ED Course as of 11/10/23 0702   Thu Nov 09, 2023   7801 Patient reassessed. She is feeling better but the back pain is still persistent.  [SD]   8124 Patient was able to ambulate to the bathroom, still having some pain. We will give her a dose of IV Toradol. [VG]   5563 Given that the patient is still having pretty significant pain and the findings on her CT showing multiple herniated disks and recommendation for evaluation of possible nerve root compromise, we will move forward with obtaining an MRI without contrast of the lumbar spine. Patient is amenable to this plan. She was additionally given IV Decadron.  [VG]   0500 Patient signed out to Dr. Geovanna Wylie at this time, pending lumbar MRI and possible admission for intractable back pain.  [VG]   4528 Spoke with Dr. Pina Vasquez of neurosurgery he requests an MRI with contrast of the lumbar spine as well as coags and inflammatory markers [CF]      ED Course User Index  [CF] Radha Toro DO  [SD] Flora Abraham MD  [VG] Case Faulkner DO       Discussion With Other Professionals:  Consultation with IP CONSULT TO SOCIAL WORK  IP CONSULT TO

## 2023-11-10 ENCOUNTER — HOSPITAL ENCOUNTER (INPATIENT)
Age: 53
LOS: 2 days | Discharge: HOME | DRG: 552 | End: 2023-11-12
Attending: STUDENT IN AN ORGANIZED HEALTH CARE EDUCATION/TRAINING PROGRAM | Admitting: INTERNAL MEDICINE
Payer: COMMERCIAL

## 2023-11-10 VITALS
WEIGHT: 293 LBS | SYSTOLIC BLOOD PRESSURE: 133 MMHG | DIASTOLIC BLOOD PRESSURE: 74 MMHG | HEART RATE: 87 BPM | TEMPERATURE: 98.3 F | RESPIRATION RATE: 16 BRPM | BODY MASS INDEX: 41.95 KG/M2 | OXYGEN SATURATION: 98 % | HEIGHT: 70 IN

## 2023-11-10 DIAGNOSIS — D72.829 LEUKOCYTOSIS, UNSPECIFIED TYPE: ICD-10-CM

## 2023-11-10 DIAGNOSIS — M51.26 LUMBAR DISC HERNIATION: Primary | ICD-10-CM

## 2023-11-10 DIAGNOSIS — M25.48 EFFUSION OF LUMBAR FACET JOINT: ICD-10-CM

## 2023-11-10 DIAGNOSIS — Z86.73 H/O TIA (TRANSIENT ISCHEMIC ATTACK) AND STROKE: ICD-10-CM

## 2023-11-10 DIAGNOSIS — M54.9 ACUTE RIGHT-SIDED BACK PAIN, UNSPECIFIED BACK LOCATION: ICD-10-CM

## 2023-11-10 PROCEDURE — 2580000003 HC RX 258: Performed by: STUDENT IN AN ORGANIZED HEALTH CARE EDUCATION/TRAINING PROGRAM

## 2023-11-10 PROCEDURE — 6370000000 HC RX 637 (ALT 250 FOR IP): Performed by: STUDENT IN AN ORGANIZED HEALTH CARE EDUCATION/TRAINING PROGRAM

## 2023-11-10 PROCEDURE — 2060000000 HC ICU INTERMEDIATE R&B

## 2023-11-10 PROCEDURE — 6360000002 HC RX W HCPCS: Performed by: STUDENT IN AN ORGANIZED HEALTH CARE EDUCATION/TRAINING PROGRAM

## 2023-11-10 RX ORDER — HYDROCHLOROTHIAZIDE 25 MG/1
12.5 TABLET ORAL EVERY MORNING
Status: DISCONTINUED | OUTPATIENT
Start: 2023-11-11 | End: 2023-11-12 | Stop reason: HOSPADM

## 2023-11-10 RX ORDER — ASPIRIN 81 MG/1
81 TABLET ORAL DAILY
COMMUNITY

## 2023-11-10 RX ORDER — POTASSIUM CHLORIDE 7.45 MG/ML
10 INJECTION INTRAVENOUS PRN
Status: DISCONTINUED | OUTPATIENT
Start: 2023-11-10 | End: 2023-11-12 | Stop reason: HOSPADM

## 2023-11-10 RX ORDER — SODIUM CHLORIDE 0.9 % (FLUSH) 0.9 %
10 SYRINGE (ML) INJECTION PRN
Status: DISCONTINUED | OUTPATIENT
Start: 2023-11-10 | End: 2023-11-12 | Stop reason: HOSPADM

## 2023-11-10 RX ORDER — SODIUM CHLORIDE 9 MG/ML
INJECTION, SOLUTION INTRAVENOUS PRN
Status: DISCONTINUED | OUTPATIENT
Start: 2023-11-10 | End: 2023-11-12 | Stop reason: HOSPADM

## 2023-11-10 RX ORDER — FENTANYL CITRATE 50 UG/ML
50 INJECTION, SOLUTION INTRAMUSCULAR; INTRAVENOUS ONCE
Status: COMPLETED | OUTPATIENT
Start: 2023-11-10 | End: 2023-11-10

## 2023-11-10 RX ORDER — ONDANSETRON 2 MG/ML
4 INJECTION INTRAMUSCULAR; INTRAVENOUS EVERY 6 HOURS PRN
Status: DISCONTINUED | OUTPATIENT
Start: 2023-11-10 | End: 2023-11-12 | Stop reason: HOSPADM

## 2023-11-10 RX ORDER — ENOXAPARIN SODIUM 100 MG/ML
30 INJECTION SUBCUTANEOUS 2 TIMES DAILY
Status: DISCONTINUED | OUTPATIENT
Start: 2023-11-10 | End: 2023-11-12 | Stop reason: HOSPADM

## 2023-11-10 RX ORDER — HYDRALAZINE HYDROCHLORIDE 20 MG/ML
10 INJECTION INTRAMUSCULAR; INTRAVENOUS EVERY 4 HOURS PRN
Status: DISCONTINUED | OUTPATIENT
Start: 2023-11-10 | End: 2023-11-12 | Stop reason: HOSPADM

## 2023-11-10 RX ORDER — POTASSIUM CHLORIDE 20 MEQ/1
40 TABLET, EXTENDED RELEASE ORAL PRN
Status: DISCONTINUED | OUTPATIENT
Start: 2023-11-10 | End: 2023-11-12 | Stop reason: HOSPADM

## 2023-11-10 RX ORDER — ACETAMINOPHEN 325 MG/1
650 TABLET ORAL EVERY 6 HOURS PRN
Status: DISCONTINUED | OUTPATIENT
Start: 2023-11-10 | End: 2023-11-12 | Stop reason: HOSPADM

## 2023-11-10 RX ORDER — ACETAMINOPHEN 650 MG/1
650 SUPPOSITORY RECTAL EVERY 6 HOURS PRN
Status: DISCONTINUED | OUTPATIENT
Start: 2023-11-10 | End: 2023-11-12 | Stop reason: HOSPADM

## 2023-11-10 RX ORDER — ONDANSETRON 4 MG/1
4 TABLET, ORALLY DISINTEGRATING ORAL EVERY 8 HOURS PRN
Status: DISCONTINUED | OUTPATIENT
Start: 2023-11-10 | End: 2023-11-12 | Stop reason: HOSPADM

## 2023-11-10 RX ORDER — SENNOSIDES A AND B 8.6 MG/1
1 TABLET, FILM COATED ORAL DAILY PRN
Status: DISCONTINUED | OUTPATIENT
Start: 2023-11-10 | End: 2023-11-12 | Stop reason: HOSPADM

## 2023-11-10 RX ORDER — MORPHINE SULFATE 2 MG/ML
2 INJECTION, SOLUTION INTRAMUSCULAR; INTRAVENOUS EVERY 4 HOURS PRN
Status: DISCONTINUED | OUTPATIENT
Start: 2023-11-10 | End: 2023-11-12 | Stop reason: HOSPADM

## 2023-11-10 RX ORDER — LANOLIN ALCOHOL/MO/W.PET/CERES
3 CREAM (GRAM) TOPICAL NIGHTLY PRN
Status: DISCONTINUED | OUTPATIENT
Start: 2023-11-10 | End: 2023-11-12 | Stop reason: HOSPADM

## 2023-11-10 RX ORDER — SODIUM CHLORIDE 0.9 % (FLUSH) 0.9 %
10 SYRINGE (ML) INJECTION EVERY 12 HOURS SCHEDULED
Status: DISCONTINUED | OUTPATIENT
Start: 2023-11-10 | End: 2023-11-12 | Stop reason: HOSPADM

## 2023-11-10 RX ADMIN — FENTANYL CITRATE 50 MCG: 50 INJECTION, SOLUTION INTRAMUSCULAR; INTRAVENOUS at 15:39

## 2023-11-10 RX ADMIN — Medication 10 ML: at 21:08

## 2023-11-10 RX ADMIN — VANCOMYCIN HYDROCHLORIDE 2000 MG: 5 INJECTION, POWDER, LYOPHILIZED, FOR SOLUTION INTRAVENOUS at 14:00

## 2023-11-10 RX ADMIN — PIPERACILLIN AND TAZOBACTAM 4500 MG: 4; .5 INJECTION, POWDER, LYOPHILIZED, FOR SOLUTION INTRAVENOUS at 13:11

## 2023-11-10 RX ADMIN — FENTANYL CITRATE 50 MCG: 50 INJECTION, SOLUTION INTRAMUSCULAR; INTRAVENOUS at 06:33

## 2023-11-10 RX ADMIN — ACETAMINOPHEN 650 MG: 325 TABLET ORAL at 21:56

## 2023-11-10 ASSESSMENT — PAIN DESCRIPTION - ONSET: ONSET: ON-GOING

## 2023-11-10 ASSESSMENT — PAIN - FUNCTIONAL ASSESSMENT
PAIN_FUNCTIONAL_ASSESSMENT: 0-10
PAIN_FUNCTIONAL_ASSESSMENT: 0-10
PAIN_FUNCTIONAL_ASSESSMENT: ACTIVITIES ARE NOT PREVENTED
PAIN_FUNCTIONAL_ASSESSMENT: 0-10

## 2023-11-10 ASSESSMENT — PAIN DESCRIPTION - DESCRIPTORS
DESCRIPTORS: STABBING
DESCRIPTORS: SHARP

## 2023-11-10 ASSESSMENT — PAIN SCALES - GENERAL
PAINLEVEL_OUTOF10: 3
PAINLEVEL_OUTOF10: 0
PAINLEVEL_OUTOF10: 8
PAINLEVEL_OUTOF10: 5
PAINLEVEL_OUTOF10: 3
PAINLEVEL_OUTOF10: 3

## 2023-11-10 ASSESSMENT — PAIN DESCRIPTION - LOCATION
LOCATION: HIP
LOCATION: BACK
LOCATION: BACK

## 2023-11-10 ASSESSMENT — PAIN DESCRIPTION - ORIENTATION: ORIENTATION: RIGHT

## 2023-11-10 ASSESSMENT — PAIN DESCRIPTION - PAIN TYPE: TYPE: ACUTE PAIN

## 2023-11-10 NOTE — ED NOTES
[address] [facility] [id number]  [phone]  [facility] ETA 1415     Cornelious Irons  11/10/23 1341

## 2023-11-11 LAB
ALBUMIN SERPL-MCNC: 3.5 G/DL (ref 3.5–5.2)
ALP SERPL-CCNC: 72 U/L (ref 35–104)
ALT SERPL-CCNC: 9 U/L (ref 0–32)
ANION GAP SERPL CALCULATED.3IONS-SCNC: 15 MMOL/L (ref 7–16)
AST SERPL-CCNC: 12 U/L (ref 0–31)
BASOPHILS # BLD: 0.09 K/UL (ref 0–0.2)
BASOPHILS NFR BLD: 1 % (ref 0–2)
BILIRUB SERPL-MCNC: 0.5 MG/DL (ref 0–1.2)
BUN SERPL-MCNC: 17 MG/DL (ref 6–20)
CALCIUM SERPL-MCNC: 9 MG/DL (ref 8.6–10.2)
CHLORIDE SERPL-SCNC: 104 MMOL/L (ref 98–107)
CO2 SERPL-SCNC: 21 MMOL/L (ref 22–29)
CREAT SERPL-MCNC: 0.7 MG/DL (ref 0.5–1)
EOSINOPHIL # BLD: 0.22 K/UL (ref 0.05–0.5)
EOSINOPHILS RELATIVE PERCENT: 2 % (ref 0–6)
ERYTHROCYTE [DISTWIDTH] IN BLOOD BY AUTOMATED COUNT: 14.2 % (ref 11.5–15)
GFR SERPL CREATININE-BSD FRML MDRD: >60 ML/MIN/1.73M2
GLUCOSE SERPL-MCNC: 97 MG/DL (ref 74–99)
HCT VFR BLD AUTO: 38.2 % (ref 34–48)
HGB BLD-MCNC: 12.4 G/DL (ref 11.5–15.5)
IMM GRANULOCYTES # BLD AUTO: 0.04 K/UL (ref 0–0.58)
IMM GRANULOCYTES NFR BLD: 0 % (ref 0–5)
LYMPHOCYTES NFR BLD: 3.61 K/UL (ref 1.5–4)
LYMPHOCYTES RELATIVE PERCENT: 36 % (ref 20–42)
MAGNESIUM SERPL-MCNC: 1.8 MG/DL (ref 1.6–2.6)
MCH RBC QN AUTO: 28.2 PG (ref 26–35)
MCHC RBC AUTO-ENTMCNC: 32.5 G/DL (ref 32–34.5)
MCV RBC AUTO: 87 FL (ref 80–99.9)
MONOCYTES NFR BLD: 0.94 K/UL (ref 0.1–0.95)
MONOCYTES NFR BLD: 9 % (ref 2–12)
NEUTROPHILS NFR BLD: 51 % (ref 43–80)
NEUTS SEG NFR BLD: 5.06 K/UL (ref 1.8–7.3)
PLATELET # BLD AUTO: 268 K/UL (ref 130–450)
PMV BLD AUTO: 11 FL (ref 7–12)
POTASSIUM SERPL-SCNC: 3.5 MMOL/L (ref 3.5–5)
PROCALCITONIN SERPL-MCNC: 0.03 NG/ML (ref 0–0.08)
PROT SERPL-MCNC: 6.5 G/DL (ref 6.4–8.3)
RBC # BLD AUTO: 4.39 M/UL (ref 3.5–5.5)
RHEUMATOID FACT SER NEPH-ACNC: 17 IU/ML (ref 0–13)
SODIUM SERPL-SCNC: 140 MMOL/L (ref 132–146)
WBC OTHER # BLD: 10 K/UL (ref 4.5–11.5)

## 2023-11-11 PROCEDURE — 86431 RHEUMATOID FACTOR QUANT: CPT

## 2023-11-11 PROCEDURE — 2580000003 HC RX 258: Performed by: STUDENT IN AN ORGANIZED HEALTH CARE EDUCATION/TRAINING PROGRAM

## 2023-11-11 PROCEDURE — 84145 PROCALCITONIN (PCT): CPT

## 2023-11-11 PROCEDURE — 2060000000 HC ICU INTERMEDIATE R&B

## 2023-11-11 PROCEDURE — 6370000000 HC RX 637 (ALT 250 FOR IP): Performed by: SPECIALIST

## 2023-11-11 PROCEDURE — 6370000000 HC RX 637 (ALT 250 FOR IP): Performed by: INTERNAL MEDICINE

## 2023-11-11 PROCEDURE — 6360000002 HC RX W HCPCS: Performed by: STUDENT IN AN ORGANIZED HEALTH CARE EDUCATION/TRAINING PROGRAM

## 2023-11-11 PROCEDURE — 85025 COMPLETE CBC W/AUTO DIFF WBC: CPT

## 2023-11-11 PROCEDURE — 83735 ASSAY OF MAGNESIUM: CPT

## 2023-11-11 PROCEDURE — 86039 ANTINUCLEAR ANTIBODIES (ANA): CPT

## 2023-11-11 PROCEDURE — 86038 ANTINUCLEAR ANTIBODIES: CPT

## 2023-11-11 PROCEDURE — 6370000000 HC RX 637 (ALT 250 FOR IP): Performed by: STUDENT IN AN ORGANIZED HEALTH CARE EDUCATION/TRAINING PROGRAM

## 2023-11-11 PROCEDURE — 36415 COLL VENOUS BLD VENIPUNCTURE: CPT

## 2023-11-11 PROCEDURE — 80053 COMPREHEN METABOLIC PANEL: CPT

## 2023-11-11 RX ORDER — DOXYCYCLINE HYCLATE 100 MG/1
100 CAPSULE ORAL EVERY 12 HOURS SCHEDULED
Status: DISCONTINUED | OUTPATIENT
Start: 2023-11-11 | End: 2023-11-12 | Stop reason: HOSPADM

## 2023-11-11 RX ORDER — CIPROFLOXACIN 500 MG/1
500 TABLET, FILM COATED ORAL EVERY 12 HOURS SCHEDULED
Status: DISCONTINUED | OUTPATIENT
Start: 2023-11-11 | End: 2023-11-12 | Stop reason: HOSPADM

## 2023-11-11 RX ORDER — TRAMADOL HYDROCHLORIDE 50 MG/1
50 TABLET ORAL EVERY 6 HOURS PRN
Status: DISCONTINUED | OUTPATIENT
Start: 2023-11-11 | End: 2023-11-12 | Stop reason: HOSPADM

## 2023-11-11 RX ADMIN — CIPROFLOXACIN HYDROCHLORIDE 500 MG: 500 TABLET, FILM COATED ORAL at 20:08

## 2023-11-11 RX ADMIN — DOXYCYCLINE HYCLATE 100 MG: 100 CAPSULE ORAL at 20:07

## 2023-11-11 RX ADMIN — HYDROCHLOROTHIAZIDE 12.5 MG: 25 TABLET ORAL at 08:43

## 2023-11-11 RX ADMIN — MORPHINE SULFATE 2 MG: 2 INJECTION, SOLUTION INTRAMUSCULAR; INTRAVENOUS at 04:07

## 2023-11-11 RX ADMIN — TRAMADOL HYDROCHLORIDE 50 MG: 50 TABLET ORAL at 19:00

## 2023-11-11 RX ADMIN — Medication 10 ML: at 20:10

## 2023-11-11 ASSESSMENT — PAIN SCALES - GENERAL
PAINLEVEL_OUTOF10: 0
PAINLEVEL_OUTOF10: 8
PAINLEVEL_OUTOF10: 0

## 2023-11-11 NOTE — H&P
BONES/ALIGNMENT: There is normal alignment of the spine. The vertebral body heights are maintained. No osseous destructive lesion is seen. No significant disc space narrowing. The vertebral body heights is preserved. DEGENERATIVE CHANGES: Multilevel degenerative changes identified diffusely throughout the lumbar spine. Shortened pedicles throughout the lumbar spine revealing congenital central spinal canal narrowing. There are broad-based disc bulges identified at the L3/L4, L4/L5 and L5/S1 levels. There are varying degrees of neural foraminal narrowing from L3 through S1. Mild narrowing of the central spinal canal.  No severe central spinal canal stenosis. Degenerative changes seen within the posterior facets. Anterior spurring and bridging osteophyte formation predominately within the thoracolumbar spine. Consider MRI for further evaluation of nerve root contacting compromise. SOFT TISSUES/RETROPERITONEUM: No paraspinal mass is seen. Varying degrees of degenerative change with some broad-based disc bulge creating narrowing of the neural foramina from L3 through S1. No significant central spinal canal stenosis however there is shortening identified of the pedicles to suggest mild congenital spinal canal narrowing. Consider MRI for further evaluation of nerve root contacting compromise if clinical symptoms persist.  No acute fracture or traumatic malalignment. CT ABDOMEN PELVIS W IV CONTRAST Additional Contrast? None    Result Date: 11/9/2023  EXAMINATION: CT OF THE ABDOMEN AND PELVIS WITH CONTRAST 11/9/2023 5:40 am TECHNIQUE: CT of the abdomen and pelvis was performed with the administration of intravenous contrast. Multiplanar reformatted images are provided for review. Automated exposure control, iterative reconstruction, and/or weight based adjustment of the mA/kV was utilized to reduce the radiation dose to as low as reasonably achievable.  COMPARISON: 03/13/2020 HISTORY: ORDERING SYSTEM

## 2023-11-12 VITALS
TEMPERATURE: 97.2 F | RESPIRATION RATE: 19 BRPM | SYSTOLIC BLOOD PRESSURE: 128 MMHG | HEIGHT: 70 IN | WEIGHT: 293 LBS | BODY MASS INDEX: 41.95 KG/M2 | HEART RATE: 79 BPM | OXYGEN SATURATION: 94 % | DIASTOLIC BLOOD PRESSURE: 80 MMHG

## 2023-11-12 LAB
ALBUMIN SERPL-MCNC: 3.7 G/DL (ref 3.5–5.2)
ALP SERPL-CCNC: 70 U/L (ref 35–104)
ALT SERPL-CCNC: 8 U/L (ref 0–32)
ANION GAP SERPL CALCULATED.3IONS-SCNC: 14 MMOL/L (ref 7–16)
AST SERPL-CCNC: 11 U/L (ref 0–31)
BASOPHILS # BLD: 0.07 K/UL (ref 0–0.2)
BASOPHILS NFR BLD: 1 % (ref 0–2)
BILIRUB SERPL-MCNC: 0.4 MG/DL (ref 0–1.2)
BUN SERPL-MCNC: 14 MG/DL (ref 6–20)
CALCIUM SERPL-MCNC: 8.8 MG/DL (ref 8.6–10.2)
CHLORIDE SERPL-SCNC: 102 MMOL/L (ref 98–107)
CO2 SERPL-SCNC: 22 MMOL/L (ref 22–29)
CREAT SERPL-MCNC: 0.7 MG/DL (ref 0.5–1)
EOSINOPHIL # BLD: 0.35 K/UL (ref 0.05–0.5)
EOSINOPHILS RELATIVE PERCENT: 4 % (ref 0–6)
ERYTHROCYTE [DISTWIDTH] IN BLOOD BY AUTOMATED COUNT: 14 % (ref 11.5–15)
GFR SERPL CREATININE-BSD FRML MDRD: >60 ML/MIN/1.73M2
GLUCOSE SERPL-MCNC: 86 MG/DL (ref 74–99)
HCT VFR BLD AUTO: 40.4 % (ref 34–48)
HGB BLD-MCNC: 12.8 G/DL (ref 11.5–15.5)
IMM GRANULOCYTES # BLD AUTO: 0.06 K/UL (ref 0–0.58)
IMM GRANULOCYTES NFR BLD: 1 % (ref 0–5)
LYMPHOCYTES NFR BLD: 3.48 K/UL (ref 1.5–4)
LYMPHOCYTES RELATIVE PERCENT: 36 % (ref 20–42)
MCH RBC QN AUTO: 27.5 PG (ref 26–35)
MCHC RBC AUTO-ENTMCNC: 31.7 G/DL (ref 32–34.5)
MCV RBC AUTO: 86.9 FL (ref 80–99.9)
MICROORGANISM SPEC CULT: NORMAL
MICROORGANISM SPEC CULT: NORMAL
MONOCYTES NFR BLD: 0.81 K/UL (ref 0.1–0.95)
MONOCYTES NFR BLD: 8 % (ref 2–12)
NEUTROPHILS NFR BLD: 51 % (ref 43–80)
NEUTS SEG NFR BLD: 4.85 K/UL (ref 1.8–7.3)
PLATELET # BLD AUTO: 292 K/UL (ref 130–450)
PMV BLD AUTO: 10.6 FL (ref 7–12)
POTASSIUM SERPL-SCNC: 3.7 MMOL/L (ref 3.5–5)
PROT SERPL-MCNC: 6.6 G/DL (ref 6.4–8.3)
RBC # BLD AUTO: 4.65 M/UL (ref 3.5–5.5)
SERVICE CMNT-IMP: NORMAL
SERVICE CMNT-IMP: NORMAL
SODIUM SERPL-SCNC: 138 MMOL/L (ref 132–146)
SPECIMEN DESCRIPTION: NORMAL
SPECIMEN DESCRIPTION: NORMAL
WBC OTHER # BLD: 9.6 K/UL (ref 4.5–11.5)

## 2023-11-12 PROCEDURE — 36415 COLL VENOUS BLD VENIPUNCTURE: CPT

## 2023-11-12 PROCEDURE — 6370000000 HC RX 637 (ALT 250 FOR IP): Performed by: SPECIALIST

## 2023-11-12 PROCEDURE — 6370000000 HC RX 637 (ALT 250 FOR IP): Performed by: STUDENT IN AN ORGANIZED HEALTH CARE EDUCATION/TRAINING PROGRAM

## 2023-11-12 PROCEDURE — 97165 OT EVAL LOW COMPLEX 30 MIN: CPT

## 2023-11-12 PROCEDURE — 85025 COMPLETE CBC W/AUTO DIFF WBC: CPT

## 2023-11-12 PROCEDURE — 6370000000 HC RX 637 (ALT 250 FOR IP): Performed by: INTERNAL MEDICINE

## 2023-11-12 PROCEDURE — 97161 PT EVAL LOW COMPLEX 20 MIN: CPT

## 2023-11-12 PROCEDURE — 6360000002 HC RX W HCPCS: Performed by: STUDENT IN AN ORGANIZED HEALTH CARE EDUCATION/TRAINING PROGRAM

## 2023-11-12 PROCEDURE — 2580000003 HC RX 258: Performed by: STUDENT IN AN ORGANIZED HEALTH CARE EDUCATION/TRAINING PROGRAM

## 2023-11-12 PROCEDURE — 80053 COMPREHEN METABOLIC PANEL: CPT

## 2023-11-12 RX ORDER — LANOLIN ALCOHOL/MO/W.PET/CERES
3 CREAM (GRAM) TOPICAL NIGHTLY PRN
Qty: 30 TABLET | Refills: 0 | Status: SHIPPED | OUTPATIENT
Start: 2023-11-12

## 2023-11-12 RX ORDER — DOXYCYCLINE HYCLATE 100 MG/1
100 CAPSULE ORAL EVERY 12 HOURS SCHEDULED
Qty: 20 CAPSULE | Refills: 0 | Status: SHIPPED | OUTPATIENT
Start: 2023-11-12 | End: 2023-11-22

## 2023-11-12 RX ORDER — TRAMADOL HYDROCHLORIDE 50 MG/1
50 TABLET ORAL EVERY 6 HOURS PRN
Qty: 18 TABLET | Refills: 0 | Status: SHIPPED | OUTPATIENT
Start: 2023-11-12 | End: 2023-11-15

## 2023-11-12 RX ORDER — CIPROFLOXACIN 500 MG/1
500 TABLET, FILM COATED ORAL EVERY 12 HOURS SCHEDULED
Qty: 20 TABLET | Refills: 0 | Status: SHIPPED | OUTPATIENT
Start: 2023-11-12 | End: 2023-11-22

## 2023-11-12 RX ADMIN — DOXYCYCLINE HYCLATE 100 MG: 100 CAPSULE ORAL at 08:09

## 2023-11-12 RX ADMIN — CIPROFLOXACIN HYDROCHLORIDE 500 MG: 500 TABLET, FILM COATED ORAL at 08:09

## 2023-11-12 RX ADMIN — ACETAMINOPHEN 650 MG: 325 TABLET ORAL at 08:09

## 2023-11-12 RX ADMIN — HYDROCHLOROTHIAZIDE 12.5 MG: 25 TABLET ORAL at 08:09

## 2023-11-12 RX ADMIN — SODIUM CHLORIDE, PRESERVATIVE FREE 10 ML: 5 INJECTION INTRAVENOUS at 09:18

## 2023-11-12 RX ADMIN — TRAMADOL HYDROCHLORIDE 50 MG: 50 TABLET ORAL at 02:56

## 2023-11-12 RX ADMIN — ONDANSETRON 4 MG: 2 INJECTION INTRAMUSCULAR; INTRAVENOUS at 09:18

## 2023-11-12 RX ADMIN — Medication 10 ML: at 08:11

## 2023-11-12 ASSESSMENT — PAIN DESCRIPTION - PAIN TYPE: TYPE: ACUTE PAIN

## 2023-11-12 ASSESSMENT — PAIN SCALES - GENERAL
PAINLEVEL_OUTOF10: 5
PAINLEVEL_OUTOF10: 5

## 2023-11-12 ASSESSMENT — PAIN DESCRIPTION - DESCRIPTORS: DESCRIPTORS: ACHING;DISCOMFORT;DULL

## 2023-11-12 ASSESSMENT — PAIN - FUNCTIONAL ASSESSMENT: PAIN_FUNCTIONAL_ASSESSMENT: ACTIVITIES ARE NOT PREVENTED

## 2023-11-12 ASSESSMENT — PAIN DESCRIPTION - LOCATION: LOCATION: HEAD

## 2023-11-12 ASSESSMENT — PAIN DESCRIPTION - FREQUENCY: FREQUENCY: INTERMITTENT

## 2023-11-12 ASSESSMENT — PAIN DESCRIPTION - ONSET: ONSET: ON-GOING

## 2023-11-12 ASSESSMENT — PAIN DESCRIPTION - ORIENTATION: ORIENTATION: ANTERIOR

## 2023-11-12 NOTE — DISCHARGE SUMMARY
Internal Medicine Discharge Summary    Patient ID: Maria G Delacruz      Patient's PCP: Kin Uriarte MD    Admit Date: 11/10/2023     Discharge Date:   11 12 23    Admitting Physician: Kenia Soliman MD     Discharge Physician: Emma Miles MD     Discharge Diagnoses: Active Hospital Problems    Diagnosis Date Noted    Effusion of lumbar facet joint [M25.48] 11/10/2023    Lumbar disc herniation [M51.26] 11/10/2023       The patient was seen and examined on day of discharge and this discharge summary is in conjunction with any daily progress note from day of discharge. Hospital Course: Ms. Maria G Delacruz, a 52y.o. year old female  who  has a past medical history of Arthritis, Headache, Hypertension, Left-sided headache, Left-sided weakness, Numbness, TIA (transient ischemic attack), and Trouble swallowing. This is a 18-year-old female. She has been struggling with laryngitis and pharyngitis type of syndrome. She has been seen by multiple doctors. Unclear if shewas positive for any infection like strep. Developed significant severe right-sided lower back pain with radiation to the right leg and could not put weight on it. Came to the ER and there was inflammatory change of the facet joint   There was concern for need for neurosurgery. They have seen her and they recommended infectious disease consult. She was also previously diagnosed with UTI and given antibiotics. She has a previous history of bilateral knee replacements. There is concern for fluid around the facet joint \"effusion\"      She has been seen by neurology as well as by infectious disease. Infectious disease does not feel she can rule out an infection and she was started on antibiotics and she does feel better. She is going to be on doxycycline and ciprofloxacin. Echo ordered however could be done in the outpatient setting per infectious disease.   No neurosurgical intervention    Exam:     /79   Pulse 74

## 2023-11-13 LAB — ANA SER QL IA: NEGATIVE

## 2023-11-14 LAB
MICROORGANISM SPEC CULT: NORMAL
MICROORGANISM SPEC CULT: NORMAL
SERVICE CMNT-IMP: NORMAL
SERVICE CMNT-IMP: NORMAL
SPECIMEN DESCRIPTION: NORMAL
SPECIMEN DESCRIPTION: NORMAL